# Patient Record
Sex: FEMALE | Race: WHITE | NOT HISPANIC OR LATINO | Employment: UNEMPLOYED | URBAN - NONMETROPOLITAN AREA
[De-identification: names, ages, dates, MRNs, and addresses within clinical notes are randomized per-mention and may not be internally consistent; named-entity substitution may affect disease eponyms.]

---

## 2023-06-14 ENCOUNTER — TELEPHONE (OUTPATIENT)
Dept: PRIMARY CARE | Facility: CLINIC | Age: 68
End: 2023-06-14
Payer: MEDICARE

## 2023-06-15 DIAGNOSIS — E03.9 HYPOTHYROIDISM, UNSPECIFIED TYPE: ICD-10-CM

## 2023-06-15 DIAGNOSIS — E78.2 MIXED HYPERLIPIDEMIA: ICD-10-CM

## 2023-06-15 DIAGNOSIS — E55.9 VITAMIN D DEFICIENCY: ICD-10-CM

## 2023-06-15 DIAGNOSIS — Z00.00 WELLNESS EXAMINATION: Primary | ICD-10-CM

## 2023-06-20 ENCOUNTER — LAB (OUTPATIENT)
Dept: LAB | Facility: LAB | Age: 68
End: 2023-06-20
Payer: MEDICARE

## 2023-06-20 DIAGNOSIS — Z00.00 WELLNESS EXAMINATION: ICD-10-CM

## 2023-06-20 DIAGNOSIS — E03.9 HYPOTHYROIDISM, UNSPECIFIED TYPE: ICD-10-CM

## 2023-06-20 DIAGNOSIS — E78.2 MIXED HYPERLIPIDEMIA: ICD-10-CM

## 2023-06-20 DIAGNOSIS — E55.9 VITAMIN D DEFICIENCY: ICD-10-CM

## 2023-06-20 LAB
ALANINE AMINOTRANSFERASE (SGPT) (U/L) IN SER/PLAS: 17 U/L (ref 7–45)
ALBUMIN (G/DL) IN SER/PLAS: 4.3 G/DL (ref 3.4–5)
ALKALINE PHOSPHATASE (U/L) IN SER/PLAS: 76 U/L (ref 33–136)
ANION GAP IN SER/PLAS: 12 MMOL/L (ref 10–20)
ASPARTATE AMINOTRANSFERASE (SGOT) (U/L) IN SER/PLAS: 22 U/L (ref 9–39)
BASOPHILS (10*3/UL) IN BLOOD BY AUTOMATED COUNT: 0.05 X10E9/L (ref 0–0.1)
BASOPHILS/100 LEUKOCYTES IN BLOOD BY AUTOMATED COUNT: 0.7 % (ref 0–2)
BILIRUBIN TOTAL (MG/DL) IN SER/PLAS: 1.7 MG/DL (ref 0–1.2)
CALCIDIOL (25 OH VITAMIN D3) (NG/ML) IN SER/PLAS: 104 NG/ML
CALCIUM (MG/DL) IN SER/PLAS: 9.6 MG/DL (ref 8.6–10.6)
CARBON DIOXIDE, TOTAL (MMOL/L) IN SER/PLAS: 25 MMOL/L (ref 21–32)
CHLORIDE (MMOL/L) IN SER/PLAS: 109 MMOL/L (ref 98–107)
CHOLESTEROL (MG/DL) IN SER/PLAS: 207 MG/DL (ref 0–199)
CHOLESTEROL IN HDL (MG/DL) IN SER/PLAS: 59.8 MG/DL
CHOLESTEROL/HDL RATIO: 3.5
CREATININE (MG/DL) IN SER/PLAS: 0.81 MG/DL (ref 0.5–1.05)
EOSINOPHILS (10*3/UL) IN BLOOD BY AUTOMATED COUNT: 0.15 X10E9/L (ref 0–0.7)
EOSINOPHILS/100 LEUKOCYTES IN BLOOD BY AUTOMATED COUNT: 2.2 % (ref 0–6)
ERYTHROCYTE DISTRIBUTION WIDTH (RATIO) BY AUTOMATED COUNT: 11.9 % (ref 11.5–14.5)
ERYTHROCYTE MEAN CORPUSCULAR HEMOGLOBIN CONCENTRATION (G/DL) BY AUTOMATED: 32.4 G/DL (ref 32–36)
ERYTHROCYTE MEAN CORPUSCULAR VOLUME (FL) BY AUTOMATED COUNT: 96 FL (ref 80–100)
ERYTHROCYTES (10*6/UL) IN BLOOD BY AUTOMATED COUNT: 4.25 X10E12/L (ref 4–5.2)
ESTIMATED AVERAGE GLUCOSE FOR HBA1C: 103 MG/DL
GFR FEMALE: 80 ML/MIN/1.73M2
GLUCOSE (MG/DL) IN SER/PLAS: 82 MG/DL (ref 74–99)
HEMATOCRIT (%) IN BLOOD BY AUTOMATED COUNT: 41 % (ref 36–46)
HEMOGLOBIN (G/DL) IN BLOOD: 13.3 G/DL (ref 12–16)
HEMOGLOBIN A1C/HEMOGLOBIN TOTAL IN BLOOD: 5.2 %
IMMATURE GRANULOCYTES/100 LEUKOCYTES IN BLOOD BY AUTOMATED COUNT: 0.3 % (ref 0–0.9)
LDL: 129 MG/DL (ref 0–99)
LEUKOCYTES (10*3/UL) IN BLOOD BY AUTOMATED COUNT: 6.9 X10E9/L (ref 4.4–11.3)
LYMPHOCYTES (10*3/UL) IN BLOOD BY AUTOMATED COUNT: 2.16 X10E9/L (ref 1.2–4.8)
LYMPHOCYTES/100 LEUKOCYTES IN BLOOD BY AUTOMATED COUNT: 31.2 % (ref 13–44)
MONOCYTES (10*3/UL) IN BLOOD BY AUTOMATED COUNT: 0.61 X10E9/L (ref 0.1–1)
MONOCYTES/100 LEUKOCYTES IN BLOOD BY AUTOMATED COUNT: 8.8 % (ref 2–10)
NEUTROPHILS (10*3/UL) IN BLOOD BY AUTOMATED COUNT: 3.94 X10E9/L (ref 1.2–7.7)
NEUTROPHILS/100 LEUKOCYTES IN BLOOD BY AUTOMATED COUNT: 56.8 % (ref 40–80)
NRBC (PER 100 WBCS) BY AUTOMATED COUNT: 0 /100 WBC (ref 0–0)
PLATELETS (10*3/UL) IN BLOOD AUTOMATED COUNT: 227 X10E9/L (ref 150–450)
POTASSIUM (MMOL/L) IN SER/PLAS: 4.8 MMOL/L (ref 3.5–5.3)
PROTEIN TOTAL: 6.3 G/DL (ref 6.4–8.2)
SODIUM (MMOL/L) IN SER/PLAS: 141 MMOL/L (ref 136–145)
THYROTROPIN (MIU/L) IN SER/PLAS BY DETECTION LIMIT <= 0.05 MIU/L: 0.14 MIU/L (ref 0.44–3.98)
THYROXINE (T4) FREE (NG/DL) IN SER/PLAS: 1.68 NG/DL (ref 0.78–1.48)
TRIGLYCERIDE (MG/DL) IN SER/PLAS: 90 MG/DL (ref 0–149)
UREA NITROGEN (MG/DL) IN SER/PLAS: 25 MG/DL (ref 6–23)
VLDL: 18 MG/DL (ref 0–40)

## 2023-06-20 PROCEDURE — 80061 LIPID PANEL: CPT

## 2023-06-20 PROCEDURE — 84443 ASSAY THYROID STIM HORMONE: CPT

## 2023-06-20 PROCEDURE — 83036 HEMOGLOBIN GLYCOSYLATED A1C: CPT

## 2023-06-20 PROCEDURE — 36415 COLL VENOUS BLD VENIPUNCTURE: CPT

## 2023-06-20 PROCEDURE — 82306 VITAMIN D 25 HYDROXY: CPT

## 2023-06-20 PROCEDURE — 85025 COMPLETE CBC W/AUTO DIFF WBC: CPT

## 2023-06-20 PROCEDURE — 84439 ASSAY OF FREE THYROXINE: CPT

## 2023-06-20 PROCEDURE — 80053 COMPREHEN METABOLIC PANEL: CPT

## 2023-06-21 PROBLEM — G89.29 ABDOMINAL PAIN, CHRONIC, RIGHT UPPER QUADRANT: Status: ACTIVE | Noted: 2023-06-21

## 2023-06-21 PROBLEM — E55.9 VITAMIN D DEFICIENCY: Status: ACTIVE | Noted: 2023-06-21

## 2023-06-21 PROBLEM — I25.84 CALCIFICATION OF CORONARY ARTERY: Status: ACTIVE | Noted: 2022-03-10

## 2023-06-21 PROBLEM — M54.50 LOW BACK PAIN: Status: ACTIVE | Noted: 2022-03-10

## 2023-06-21 PROBLEM — R93.1 ELEVATED CORONARY ARTERY CALCIUM SCORE: Status: ACTIVE | Noted: 2023-06-21

## 2023-06-21 PROBLEM — R91.1 PULMONARY NODULE: Status: ACTIVE | Noted: 2023-06-21

## 2023-06-21 PROBLEM — I25.10 CALCIFICATION OF CORONARY ARTERY: Status: ACTIVE | Noted: 2022-03-10

## 2023-06-21 PROBLEM — R10.11 ABDOMINAL PAIN, CHRONIC, RIGHT UPPER QUADRANT: Status: ACTIVE | Noted: 2023-06-21

## 2023-06-21 PROBLEM — M54.10 RADICULOPATHY WITH LOWER EXTREMITY SYMPTOMS: Status: ACTIVE | Noted: 2023-06-21

## 2023-06-21 PROBLEM — E78.5 HYPERLIPIDEMIA: Status: ACTIVE | Noted: 2022-03-10

## 2023-06-21 PROBLEM — E03.9 HYPOTHYROIDISM: Status: ACTIVE | Noted: 2022-03-10

## 2023-06-21 PROBLEM — K21.9 GASTROESOPHAGEAL REFLUX DISEASE: Status: ACTIVE | Noted: 2022-03-10

## 2023-06-21 PROBLEM — R93.89 ABNORMAL CHEST XRAY: Status: ACTIVE | Noted: 2023-06-21

## 2023-06-21 RX ORDER — CHOLECALCIFEROL (VITAMIN D3) 125 MCG
1 CAPSULE ORAL DAILY
COMMUNITY
End: 2023-06-23 | Stop reason: ALTCHOICE

## 2023-06-21 RX ORDER — BIOTIN 10 MG
TABLET ORAL
COMMUNITY

## 2023-06-21 RX ORDER — ASCORBIC ACID 500 MG
TABLET ORAL
COMMUNITY

## 2023-06-21 RX ORDER — FOLIC ACID 0.8 MG
TABLET ORAL
COMMUNITY

## 2023-06-21 RX ORDER — LEVOTHYROXINE SODIUM 100 UG/1
100 TABLET ORAL DAILY
COMMUNITY
End: 2023-07-21 | Stop reason: SDUPTHER

## 2023-06-23 ENCOUNTER — OFFICE VISIT (OUTPATIENT)
Dept: PRIMARY CARE | Facility: CLINIC | Age: 68
End: 2023-06-23
Payer: MEDICARE

## 2023-06-23 VITALS
BODY MASS INDEX: 24.81 KG/M2 | HEART RATE: 54 BPM | SYSTOLIC BLOOD PRESSURE: 99 MMHG | HEIGHT: 61 IN | WEIGHT: 131.4 LBS | DIASTOLIC BLOOD PRESSURE: 59 MMHG | RESPIRATION RATE: 14 BRPM | TEMPERATURE: 98.2 F | OXYGEN SATURATION: 97 %

## 2023-06-23 DIAGNOSIS — Z00.00 ROUTINE GENERAL MEDICAL EXAMINATION AT HEALTH CARE FACILITY: Primary | ICD-10-CM

## 2023-06-23 DIAGNOSIS — Z23 IMMUNIZATION DUE: ICD-10-CM

## 2023-06-23 DIAGNOSIS — Z12.31 BREAST CANCER SCREENING BY MAMMOGRAM: ICD-10-CM

## 2023-06-23 DIAGNOSIS — Z00.00 WELLNESS EXAMINATION: ICD-10-CM

## 2023-06-23 DIAGNOSIS — E03.9 HYPOTHYROIDISM, UNSPECIFIED TYPE: ICD-10-CM

## 2023-06-23 PROBLEM — Q79.0: Status: ACTIVE | Noted: 2022-03-10

## 2023-06-23 PROCEDURE — 1036F TOBACCO NON-USER: CPT | Performed by: FAMILY MEDICINE

## 2023-06-23 PROCEDURE — G0439 PPPS, SUBSEQ VISIT: HCPCS | Performed by: FAMILY MEDICINE

## 2023-06-23 PROCEDURE — 1170F FXNL STATUS ASSESSED: CPT | Performed by: FAMILY MEDICINE

## 2023-06-23 PROCEDURE — 1160F RVW MEDS BY RX/DR IN RCRD: CPT | Performed by: FAMILY MEDICINE

## 2023-06-23 PROCEDURE — 1159F MED LIST DOCD IN RCRD: CPT | Performed by: FAMILY MEDICINE

## 2023-06-23 RX ORDER — UBIDECARENONE 30 MG
30 CAPSULE ORAL DAILY
COMMUNITY

## 2023-06-23 ASSESSMENT — ACTIVITIES OF DAILY LIVING (ADL)
MANAGING_FINANCES: INDEPENDENT
DOING_HOUSEWORK: INDEPENDENT
GROCERY_SHOPPING: INDEPENDENT
TAKING_MEDICATION: INDEPENDENT
DRESSING: INDEPENDENT
BATHING: INDEPENDENT

## 2023-06-23 ASSESSMENT — PATIENT HEALTH QUESTIONNAIRE - PHQ9
1. LITTLE INTEREST OR PLEASURE IN DOING THINGS: NOT AT ALL
2. FEELING DOWN, DEPRESSED OR HOPELESS: NOT AT ALL
SUM OF ALL RESPONSES TO PHQ9 QUESTIONS 1 AND 2: 0

## 2023-06-23 ASSESSMENT — PAIN SCALES - GENERAL: PAINLEVEL: 0-NO PAIN

## 2023-06-23 NOTE — PROGRESS NOTES
"Subjective   Reason for Visit: Gely Zamorano is an 67 y.o. female here for a Medicare Wellness visit.     Past Medical, Surgical, and Family History reviewed and updated in chart.         HPI    Patient Care Team:  Param Estes MD as PCP - General  Param Estes MD as PCP - [facility] [place]     Review of Systems   All other systems reviewed and are negative.      Objective   Vitals:  BP 99/59 (BP Location: Left arm, Patient Position: Sitting, BP Cuff Size: Adult)   Pulse 54   Temp 36.8 °C (98.2 °F) (Temporal)   Resp 14   Ht 1.549 m (5' 1\")   Wt 59.6 kg (131 lb 6.4 oz)   LMP  (LMP Unknown)   SpO2 97%   BMI 24.83 kg/m²       Physical Exam  Vitals reviewed.   Constitutional:       Appearance: Normal appearance. She is normal weight.   HENT:      Head: Normocephalic and atraumatic.      Right Ear: Tympanic membrane, ear canal and external ear normal.      Left Ear: Tympanic membrane, ear canal and external ear normal.      Nose: Nose normal.      Mouth/Throat:      Mouth: Mucous membranes are moist.      Pharynx: Oropharynx is clear.   Eyes:      Extraocular Movements: Extraocular movements intact.      Conjunctiva/sclera: Conjunctivae normal.      Pupils: Pupils are equal, round, and reactive to light.   Neck:      Vascular: No carotid bruit.   Cardiovascular:      Rate and Rhythm: Normal rate and regular rhythm.      Pulses: Normal pulses.      Heart sounds: Normal heart sounds. No murmur heard.  Pulmonary:      Effort: Pulmonary effort is normal. No respiratory distress.      Breath sounds: Normal breath sounds. No wheezing, rhonchi or rales.   Abdominal:      General: Abdomen is flat. Bowel sounds are normal.      Palpations: Abdomen is soft. There is no mass.      Tenderness: There is no abdominal tenderness. There is no guarding.   Musculoskeletal:         General: No swelling or deformity. Normal range of motion.      Cervical back: Normal range of motion and neck supple.      Right " lower leg: No edema.      Left lower leg: No edema.   Lymphadenopathy:      Cervical: No cervical adenopathy.   Skin:     General: Skin is warm and dry.      Capillary Refill: Capillary refill takes less than 2 seconds.   Neurological:      General: No focal deficit present.      Mental Status: She is alert and oriented to person, place, and time.   Psychiatric:         Mood and Affect: Mood normal.         Behavior: Behavior normal.         Thought Content: Thought content normal.         Judgment: Judgment normal.         Assessment/Plan   Problem List Items Addressed This Visit       Hypothyroidism    Relevant Orders    TSH with reflex to Free T4 if abnormal    Comprehensive Metabolic Panel     Other Visit Diagnoses       Routine general medical examination at health care facility    -  Primary    Breast cancer screening by mammogram        Relevant Orders    BI mammo bilateral screening tomosynthesis    Immunization due        Wellness examination        Relevant Orders    Follow Up In [facility] - PCP          #1 Medicare wellness visit    You are up-to-date with her colonoscopy for colon cancer screening.  Please have a mammogram done this year for mammogram for breast cancer screening    For immunizations I will print the order for the shingles vaccine that can be done at your pharmacy as well as a Prevnar 20 pneumonia vaccine that can be done at your pharmacy    We did review all recent labs.  You have been taking biotin which will interfere with thyroid function testing.  Please repeat your thyroid test after 2 weeks of no biotin.  With those thyroid test I will also include the proteins and BUN and chloride test with a comprehensive metabolic and we will call you with those results    Continue eating a heart healthy diet rich with fruits vegetables and lean proteins avoiding simple sugars and fast foods    Continue with regular activity and exercise walking on a daily basis is ideal if you are  unable to run.    Continue following up with your orthopedic specialist in regards to your knee pain I do think PRP is a reasonable consideration for treatment    Your previous bone density indicated osteopenia.  We will repeat that in 1 year in the meantime continue eating a diet rich with calcium and vitamin D and regular exercise.    Your recent vitamin D levels were above normal.  Please discontinue all vitamin D for at least 1 month I think it would be safe to restart vitamin D at that time but the lower dose of just 1000 international units a day    Please continue on current doses of thyroid medication until we repeat your thyroid blood test    Please see your dermatologist as you mentioned    If you otherwise stay healthy I can see you back in 1 year but am happy to see you sooner if needed    You did mention you are having an episode of right upper quadrant abdominal discomfort.  If it returns I would recontact your gastroenterologist to discuss an EGD

## 2023-07-18 ENCOUNTER — LAB (OUTPATIENT)
Dept: LAB | Facility: LAB | Age: 68
End: 2023-07-18
Payer: MEDICARE

## 2023-07-18 DIAGNOSIS — E03.9 HYPOTHYROIDISM, UNSPECIFIED TYPE: ICD-10-CM

## 2023-07-18 LAB
ALANINE AMINOTRANSFERASE (SGPT) (U/L) IN SER/PLAS: 15 U/L (ref 7–45)
ALBUMIN (G/DL) IN SER/PLAS: 4.4 G/DL (ref 3.4–5)
ALKALINE PHOSPHATASE (U/L) IN SER/PLAS: 76 U/L (ref 33–136)
ANION GAP IN SER/PLAS: 14 MMOL/L (ref 10–20)
ASPARTATE AMINOTRANSFERASE (SGOT) (U/L) IN SER/PLAS: 20 U/L (ref 9–39)
BILIRUBIN TOTAL (MG/DL) IN SER/PLAS: 1.6 MG/DL (ref 0–1.2)
CALCIUM (MG/DL) IN SER/PLAS: 9.8 MG/DL (ref 8.6–10.6)
CARBON DIOXIDE, TOTAL (MMOL/L) IN SER/PLAS: 26 MMOL/L (ref 21–32)
CHLORIDE (MMOL/L) IN SER/PLAS: 108 MMOL/L (ref 98–107)
CREATININE (MG/DL) IN SER/PLAS: 0.71 MG/DL (ref 0.5–1.05)
GFR FEMALE: >90 ML/MIN/1.73M2
GLUCOSE (MG/DL) IN SER/PLAS: 78 MG/DL (ref 74–99)
POTASSIUM (MMOL/L) IN SER/PLAS: 4 MMOL/L (ref 3.5–5.3)
PROTEIN TOTAL: 6.4 G/DL (ref 6.4–8.2)
SODIUM (MMOL/L) IN SER/PLAS: 144 MMOL/L (ref 136–145)
THYROTROPIN (MIU/L) IN SER/PLAS BY DETECTION LIMIT <= 0.05 MIU/L: 0.05 MIU/L (ref 0.44–3.98)
THYROXINE (T4) FREE (NG/DL) IN SER/PLAS: 1.79 NG/DL (ref 0.78–1.48)
UREA NITROGEN (MG/DL) IN SER/PLAS: 26 MG/DL (ref 6–23)

## 2023-07-18 PROCEDURE — 36415 COLL VENOUS BLD VENIPUNCTURE: CPT

## 2023-07-18 PROCEDURE — 84443 ASSAY THYROID STIM HORMONE: CPT

## 2023-07-18 PROCEDURE — 84439 ASSAY OF FREE THYROXINE: CPT

## 2023-07-18 PROCEDURE — 80053 COMPREHEN METABOLIC PANEL: CPT

## 2023-07-21 DIAGNOSIS — E03.9 HYPOTHYROIDISM, UNSPECIFIED TYPE: Primary | ICD-10-CM

## 2023-07-21 RX ORDER — LEVOTHYROXINE SODIUM 88 UG/1
88 TABLET ORAL DAILY
Qty: 30 TABLET | Refills: 2 | Status: SHIPPED | OUTPATIENT
Start: 2023-07-21 | End: 2023-08-23 | Stop reason: SINTOL

## 2023-07-21 NOTE — RESULT ENCOUNTER NOTE
Call the patient and tell her the recent lab work indicates she is taking too much thyroid medication.  I recommend that she reduce dosing down to 88 mcg a day and repeat lab work in 6 weeks I will place an order for the new medication as well as for the repeat blood work

## 2023-08-22 ENCOUNTER — LAB (OUTPATIENT)
Dept: LAB | Facility: LAB | Age: 68
End: 2023-08-22
Payer: MEDICARE

## 2023-08-22 DIAGNOSIS — E03.9 HYPOTHYROIDISM, UNSPECIFIED TYPE: ICD-10-CM

## 2023-08-22 LAB
THYROTROPIN (MIU/L) IN SER/PLAS BY DETECTION LIMIT <= 0.05 MIU/L: 0.25 MIU/L (ref 0.44–3.98)
THYROXINE (T4) FREE (NG/DL) IN SER/PLAS: 1.58 NG/DL (ref 0.78–1.48)

## 2023-08-22 PROCEDURE — 84443 ASSAY THYROID STIM HORMONE: CPT

## 2023-08-22 PROCEDURE — 36415 COLL VENOUS BLD VENIPUNCTURE: CPT

## 2023-08-22 PROCEDURE — 84439 ASSAY OF FREE THYROXINE: CPT

## 2023-08-23 DIAGNOSIS — E03.9 ACQUIRED HYPOTHYROIDISM: Primary | ICD-10-CM

## 2023-08-23 RX ORDER — LEVOTHYROXINE SODIUM 75 UG/1
75 TABLET ORAL DAILY
Qty: 30 TABLET | Refills: 11 | Status: SHIPPED | OUTPATIENT
Start: 2023-08-23 | End: 2023-10-12

## 2023-10-10 ENCOUNTER — LAB (OUTPATIENT)
Dept: LAB | Facility: LAB | Age: 68
End: 2023-10-10
Payer: MEDICARE

## 2023-10-10 DIAGNOSIS — E03.9 ACQUIRED HYPOTHYROIDISM: ICD-10-CM

## 2023-10-10 PROCEDURE — 84443 ASSAY THYROID STIM HORMONE: CPT

## 2023-10-10 PROCEDURE — 84439 ASSAY OF FREE THYROXINE: CPT

## 2023-10-10 PROCEDURE — 36415 COLL VENOUS BLD VENIPUNCTURE: CPT

## 2023-10-11 LAB
T4 FREE SERPL-MCNC: 1.28 NG/DL (ref 0.78–1.48)
TSH SERPL-ACNC: 4.49 MIU/L (ref 0.44–3.98)

## 2023-10-12 ENCOUNTER — TELEPHONE (OUTPATIENT)
Dept: PRIMARY CARE | Facility: CLINIC | Age: 68
End: 2023-10-12
Payer: MEDICARE

## 2023-10-12 DIAGNOSIS — E03.9 ACQUIRED HYPOTHYROIDISM: Primary | ICD-10-CM

## 2023-10-12 DIAGNOSIS — E03.9 HYPOTHYROIDISM, UNSPECIFIED TYPE: ICD-10-CM

## 2023-10-12 RX ORDER — LEVOTHYROXINE SODIUM 88 UG/1
88 TABLET ORAL DAILY
Qty: 30 TABLET | Refills: 11 | Status: SHIPPED | OUTPATIENT
Start: 2023-10-12 | End: 2024-10-11

## 2023-10-12 RX ORDER — LEVOTHYROXINE SODIUM 88 UG/1
88 TABLET ORAL DAILY
Qty: 30 TABLET | Refills: 11 | Status: SHIPPED | OUTPATIENT
Start: 2023-10-12 | End: 2023-10-12 | Stop reason: ENTERED-IN-ERROR

## 2023-10-12 NOTE — TELEPHONE ENCOUNTER
Patient picked up new thyroid prescription    They filled it for levothyroxine NOT synthroid    She wanted to make sure you were aware and if you wanted her to take brand name only as discussed, please send that in again for 88mcg to TOMMIE Caruso with [facility]

## 2023-11-20 ENCOUNTER — ANCILLARY PROCEDURE (OUTPATIENT)
Dept: RADIOLOGY | Facility: CLINIC | Age: 68
End: 2023-11-20
Payer: MEDICARE

## 2023-11-20 DIAGNOSIS — R05.1 ACUTE COUGH: ICD-10-CM

## 2023-11-20 PROCEDURE — 71046 X-RAY EXAM CHEST 2 VIEWS: CPT | Mod: FR

## 2023-11-20 PROCEDURE — 71046 X-RAY EXAM CHEST 2 VIEWS: CPT | Mod: FOREIGN READ | Performed by: RADIOLOGY

## 2023-11-21 ENCOUNTER — LAB (OUTPATIENT)
Dept: LAB | Facility: LAB | Age: 68
End: 2023-11-21
Payer: MEDICARE

## 2023-11-21 DIAGNOSIS — E03.9 HYPOTHYROIDISM, UNSPECIFIED TYPE: ICD-10-CM

## 2023-11-21 LAB
T4 FREE SERPL-MCNC: 1.65 NG/DL (ref 0.78–1.48)
TSH SERPL-ACNC: 0.3 MIU/L (ref 0.44–3.98)

## 2023-11-21 PROCEDURE — 36415 COLL VENOUS BLD VENIPUNCTURE: CPT

## 2023-11-21 PROCEDURE — 84443 ASSAY THYROID STIM HORMONE: CPT

## 2023-11-21 PROCEDURE — 84439 ASSAY OF FREE THYROXINE: CPT

## 2023-11-27 DIAGNOSIS — E03.9 ACQUIRED HYPOTHYROIDISM: Primary | ICD-10-CM

## 2024-01-08 ENCOUNTER — ANCILLARY PROCEDURE (OUTPATIENT)
Dept: RADIOLOGY | Facility: CLINIC | Age: 69
End: 2024-01-08
Payer: MEDICARE

## 2024-01-08 DIAGNOSIS — S82.141D DISPLACED BICONDYLAR FRACTURE OF RIGHT TIBIA, SUBSEQUENT ENCOUNTER FOR CLOSED FRACTURE WITH ROUTINE HEALING: ICD-10-CM

## 2024-01-08 PROCEDURE — 73560 X-RAY EXAM OF KNEE 1 OR 2: CPT | Mod: RIGHT SIDE | Performed by: RADIOLOGY

## 2024-01-08 PROCEDURE — 73560 X-RAY EXAM OF KNEE 1 OR 2: CPT | Mod: RT

## 2024-01-22 ENCOUNTER — EVALUATION (OUTPATIENT)
Dept: PHYSICAL THERAPY | Facility: CLINIC | Age: 69
End: 2024-01-22
Payer: MEDICARE

## 2024-01-22 ENCOUNTER — HOSPITAL ENCOUNTER (OUTPATIENT)
Dept: RADIOLOGY | Facility: CLINIC | Age: 69
Discharge: HOME | End: 2024-01-22
Payer: MEDICARE

## 2024-01-22 DIAGNOSIS — S82.141D DISPLACED BICONDYLAR FRACTURE OF RIGHT TIBIA, SUBSEQUENT ENCOUNTER FOR CLOSED FRACTURE WITH ROUTINE HEALING: ICD-10-CM

## 2024-01-22 DIAGNOSIS — S82.131D RIGHT MEDIAL TIBIAL PLATEAU FRACTURE, CLOSED, WITH ROUTINE HEALING, SUBSEQUENT ENCOUNTER: ICD-10-CM

## 2024-01-22 DIAGNOSIS — R26.2 DIFFICULTY WALKING: ICD-10-CM

## 2024-01-22 DIAGNOSIS — M25.661 KNEE STIFF, RIGHT: Primary | ICD-10-CM

## 2024-01-22 PROBLEM — S82.131A RIGHT MEDIAL TIBIAL PLATEAU FRACTURE: Status: ACTIVE | Noted: 2024-01-22

## 2024-01-22 PROCEDURE — 73560 X-RAY EXAM OF KNEE 1 OR 2: CPT | Mod: RIGHT SIDE | Performed by: RADIOLOGY

## 2024-01-22 PROCEDURE — 73560 X-RAY EXAM OF KNEE 1 OR 2: CPT | Mod: RT

## 2024-01-22 PROCEDURE — 97110 THERAPEUTIC EXERCISES: CPT | Mod: GP | Performed by: PHYSICAL THERAPIST

## 2024-01-22 PROCEDURE — 97161 PT EVAL LOW COMPLEX 20 MIN: CPT | Mod: GP | Performed by: PHYSICAL THERAPIST

## 2024-01-22 ASSESSMENT — PATIENT HEALTH QUESTIONNAIRE - PHQ9
SUM OF ALL RESPONSES TO PHQ9 QUESTIONS 1 AND 2: 0
2. FEELING DOWN, DEPRESSED OR HOPELESS: NOT AT ALL
1. LITTLE INTEREST OR PLEASURE IN DOING THINGS: NOT AT ALL

## 2024-01-22 ASSESSMENT — ENCOUNTER SYMPTOMS
OCCASIONAL FEELINGS OF UNSTEADINESS: 0
LOSS OF SENSATION IN FEET: 0
DEPRESSION: 0

## 2024-01-22 ASSESSMENT — PAIN SCALES - GENERAL: PAINLEVEL_OUTOF10: 0 - NO PAIN

## 2024-01-22 ASSESSMENT — PAIN - FUNCTIONAL ASSESSMENT: PAIN_FUNCTIONAL_ASSESSMENT: 0-10

## 2024-01-22 NOTE — PROGRESS NOTES
Physical Therapy    Physical Therapy Lower Extremity Evaluation    Patient Name: Gely Zamorano  MRN: [id number]  Today's Date: 1/22/2024  Time Calculation  Start Time: 1135  Stop Time: 1220  Time Calculation (min): 45 min    Current Problem  Problem List Items Addressed This Visit             ICD-10-CM    Difficulty walking R26.2    Relevant Orders    Follow Up In Physical Therapy    Knee stiff, right - Primary M25.661    Relevant Orders    Follow Up In Physical Therapy    Right medial tibial plateau fracture S82.131A          Precautions  Precautions  Precautions Comment: 50% Wb x 1 week, then WBAT     Pain  Pain Assessment: 0-10  Pain Score: 0 - No pain    SUBJECTIVE:   Chief complaint:  Pt reports she had an injury to her R knee on 12-13-23 when her dog ran into her. Notes her dog hit her right knee form the side. Notes she feel to her left. Notes went to ER. Then saw ortho on 12/18/23 and had MRI that showed the fracture. Placed in brace locked in ext and NWB until today. Notes swelling in the knee and tightness. Notes weakness as well. Still using crutches for gait and is 50% WB on R LE ax 1 week then progress to full WB. Denies pain.    Pain Better: no pain     Pain Worse: no pain     Imaging: x-ray 12/13/23 was negative and then had MRI 12-18-23 that showed tibial plateau fracture.     Prior level of function: previously independent with all prior activity R LE including gait, stairs and ADL's.   Current limitations: walking, stairs    Home setup: 1 step to enter home, [employer] family room, stairs to basement.     Work: retired.     Patients goal: get leg stronger and walk normal      Prior tx: no prior TX R knee since injury or this year.     Medical hx has been reviewed with the patient.     OBJECTIVE:    Lower Extremity Strength:  MMT 5/5 max  RIGHT LEFT   Hip Flexion 4 5   Hip Extension 4 5   Hip Abduction 4+ 5   Hip Adduction 4+ 5   Knee Extension 4 5   Knee Flexion 4 5   Ankle DF 4+ 5   Ankle PF 4+ 5      Lower Extremity ROM: WNL unless documented below:  ROM in Degrees  RIGHT LEFT   Hip Flexion WNL WNL   Hip Extension WNL WNL   Hip Abduction WNL WNL   Hip Adduction WNL WNL   Knee Extension -5 WNL   Knee Flexion 113 WNL   Ankle DF 4 WNL   Ankle PF WNL  WNL     Joint mobility: Patella mobility hypomobile R all ranges  Gait mechanics: 50% WB R LE with use of crutches  Palpation: Tenderness to palpation lateral patella margins  Girth: R knee mid jt 37cm, L knee 34 cm   5 cm below mid jt R 35.5cm, L 32cm     Other Measures  Lower Extremity Funtional Score (LEFS): 10/80     TREATMENT:  Eval  2. Instruction in HEP:  Access Code: [email or web address]  URL: [email or web address]  Date: 01/22/2024  Prepared by: ADRIENNE Hugo    Exercises  - Quad Setting and Stretching (Mirrored)  - 1 x daily - 7 x weekly - 2 sets - 10 reps - 5 sec hold  - Seated Long Arc Quad  - 1 x daily - 7 x weekly - 2 sets - 10 reps  - Active Straight Leg Raise with Quad Set  - 1 x daily - 7 x weekly - 2 sets - 10 reps  - Sidelying Hip Abduction  - 1 x daily - 7 x weekly - 2 sets - 10 reps  - Prone Hip Extension  - 1 x daily - 7 x weekly - 2 sets - 10 reps  - Sidelying Hip Adduction  - 1 x daily - 7 x weekly - 2 sets - 10 reps  - Supine Heel Slide with Strap (Mirrored)  - 1 x daily - 7 x weekly - 2 sets - 10 reps  - Long Sitting Calf Stretch with Strap (Mirrored)  - 1 x daily - 7 x weekly - 1 sets - 3 reps - 30 sec  hold      ASSESSEMENT  Pt is a 68 y.o. referred to physical therapy for a dx of R tibial plateau fracture by Gretchen Castro DO. Pt presents with signs and symptoms of pain, weakness, reduced gait/balance and overall reduced function. This pt would benefit from a therapy program to restore prior level of function, reduce pain, increase AROM, increase strength, and improve gait and balance.     The physical therapy prognosis is good for the patient to achieve their goals.   The pt tolerated therapy treatment today well with no  adverse effects.  Barriers to therapy include:  none    PLAN  The pt will be seen 2 days a week for 6-8 weeks.      The pt has been educated about the risks and benefits of physical therapy including manual therapy treatments. Pt agrees with POC and gives consent for treatment.     The patient will benefit from physical therapy treatment to include: therapeutic exercises, therapeutic activities, neurological re-education, manual therapy, modalities, and a home exercise program.     Goals:  Active       PT Problem       Pt to ambulate with brace donned FWB R LE and least A.D.        Start:  01/22/24    Expected End:  02/11/24            Reduce edema R knee equal to left to allow for full function of the R knee.        Start:  01/22/24    Expected End:  02/21/24            Pt to be able to ambulate no A.D. FWB R LE with norm al gait pattern        Start:  01/22/24    Expected End:  02/21/24            Increase knee flexion to 140 degrees and ext to 0 degrees for gait, stairs, and ADL's.        Start:  01/22/24    Expected End:  02/21/24            Pt to increase LE strength to grossly 5/5 for improved gait, stairs, lifting and ADL's.        Start:  01/22/24    Expected End:  03/18/24            Pt to score an increase of 10 points or > on LEFS to display overall increased function.         Start:  01/22/24    Expected End:  03/18/24            Pt to be independent with a HEP for self management.        Start:  01/22/24    Expected End:  03/08/24

## 2024-01-24 ENCOUNTER — TREATMENT (OUTPATIENT)
Dept: PHYSICAL THERAPY | Facility: CLINIC | Age: 69
End: 2024-01-24
Payer: MEDICARE

## 2024-01-24 DIAGNOSIS — R26.2 DIFFICULTY WALKING: ICD-10-CM

## 2024-01-24 DIAGNOSIS — M25.661 KNEE STIFF, RIGHT: ICD-10-CM

## 2024-01-24 DIAGNOSIS — S82.131D RIGHT MEDIAL TIBIAL PLATEAU FRACTURE, CLOSED, WITH ROUTINE HEALING, SUBSEQUENT ENCOUNTER: Primary | ICD-10-CM

## 2024-01-24 PROCEDURE — 97110 THERAPEUTIC EXERCISES: CPT | Mod: GP,CQ

## 2024-01-24 ASSESSMENT — PAIN SCALES - GENERAL: PAINLEVEL_OUTOF10: 0 - NO PAIN

## 2024-01-24 ASSESSMENT — PAIN - FUNCTIONAL ASSESSMENT: PAIN_FUNCTIONAL_ASSESSMENT: 0-10

## 2024-01-24 NOTE — PROGRESS NOTES
Physical Therapy Treatment    Patient Name: Gely Zamorano  MRN: [id number]  Today's Date: 1/24/2024  Time Calculation  Start Time: 1445  Stop Time: 1540  Time Calculation (min): 55 min    Current Problem:  Problem List Items Addressed This Visit             ICD-10-CM    Difficulty walking R26.2    Knee stiff, right M25.661     Other Visit Diagnoses         Codes    Right medial tibial plateau fracture, closed, with routine healing, subsequent encounter    -  Primary S82.131D            Subjective   General:   Pt reports she has been doing well with 50% Wb'ing, brace opened and BL axillary crutches.   HEP is going well.   Edema in posterior knee.     Pain:  Pain Assessment: 0-10  Pain Score: 0 - No pain  Pain Location: Knee  Pain Orientation: Right    Precautions:  Precautions  Precautions Comment: 50% Wb x 1 week, then WBAT  1/29/24 WBAT       Objective   No objective measures taken this visit    Treatment:  Therapeutic exercise  Upright bike ROM x5 min  Seated R HSS/calf x 1 min  Seated R heel slide with slider 2x10   Seated HR/TR on 1/2 foam 2x10   R QS with SLR 2x10   R SAQ 3 sec hold 2x10   R LAQ 2 x10 HEP review  Prone TKE 2x10     Amb with BL axillary crutches around clinic 50% Wb'ing R LE      Neuromuscular Re-education       Manual       Modalities  Game ready L knee x 10 min  Skin intact    Assessment   Pt tolerated session well, demonstrating good ROM and tolerance to exercises.  Voiced some discomfort in medial knee with LAQ so cued pt to perform mid range.   Demonstrated good quad contraction with QS and prone TKE. Did well with tactile cues to quad to help facilitate quad contraction.   Discussed weaning from brace and 50% Wb'ing until 1/29.   Game ready post tx for edema management.   Plan    Continue to progress POC as tolerated by patient to improve strength, mobility and overall function    Goals:  Active       PT Problem       Pt to ambulate with brace donned FWB R LE and least A.D.         Start:  01/22/24    Expected End:  02/11/24            Reduce edema R knee equal to left to allow for full function of the R knee.        Start:  01/22/24    Expected End:  02/21/24            Pt to be able to ambulate no A.D. FWB R LE with norm al gait pattern        Start:  01/22/24    Expected End:  02/21/24            Increase knee flexion to 140 degrees and ext to 0 degrees for gait, stairs, and ADL's.        Start:  01/22/24    Expected End:  02/21/24            Pt to increase LE strength to grossly 5/5 for improved gait, stairs, lifting and ADL's.        Start:  01/22/24    Expected End:  03/18/24            Pt to score an increase of 10 points or > on LEFS to display overall increased function.         Start:  01/22/24    Expected End:  03/18/24            Pt to be independent with a HEP for self management.        Start:  01/22/24    Expected End:  03/08/24

## 2024-01-31 ENCOUNTER — TREATMENT (OUTPATIENT)
Dept: PHYSICAL THERAPY | Facility: CLINIC | Age: 69
End: 2024-01-31
Payer: MEDICARE

## 2024-01-31 DIAGNOSIS — R26.2 DIFFICULTY WALKING: ICD-10-CM

## 2024-01-31 DIAGNOSIS — M25.661 KNEE STIFF, RIGHT: ICD-10-CM

## 2024-01-31 DIAGNOSIS — S82.131D RIGHT MEDIAL TIBIAL PLATEAU FRACTURE, CLOSED, WITH ROUTINE HEALING, SUBSEQUENT ENCOUNTER: ICD-10-CM

## 2024-01-31 PROCEDURE — 97110 THERAPEUTIC EXERCISES: CPT | Mod: GP,CQ

## 2024-01-31 ASSESSMENT — PAIN SCALES - GENERAL: PAINLEVEL_OUTOF10: 0 - NO PAIN

## 2024-01-31 ASSESSMENT — PAIN - FUNCTIONAL ASSESSMENT: PAIN_FUNCTIONAL_ASSESSMENT: 0-10

## 2024-01-31 NOTE — PROGRESS NOTES
Physical Therapy Treatment    Patient Name: Gely Zamorano  MRN: [id number]  Today's Date: 1/31/2024  Time Calculation  Start Time: 1135  Stop Time: 1225  Time Calculation (min): 50 min    Current Problem:  Problem List Items Addressed This Visit             ICD-10-CM    Difficulty walking R26.2    Knee stiff, right M25.661     Other Visit Diagnoses         Codes    Right medial tibial plateau fracture, closed, with routine healing, subsequent encounter     S82.131D              Subjective   General:   Visit#: 3 of MN   Pt reports she has been gradually weaning to WBAT for approx 4 days. She is ambulating with 1 crutch, except when traversing stairs then she uses 2. Knee is swelling a bit more since increasing Wb'ing, as expected.  Anxious to progress, has been compliant with HEP + some of the exercises performed last session.     Pain:  Pain Assessment: 0-10  Pain Score: 0 - No pain  Pain Location: Knee  Pain Orientation: Right    Precautions:  Precautions  Precautions Comment:  (WBAT)  1/29/24 WBAT       Objective   No objective measures taken this visit    Treatment:  Therapeutic exercise  Upright bike x5 min  Standing R HSS x1 min  Standing calf stretch fitter board x1 min  Standing HR/TR 2x10 added to HEP    Standing hip abd, ext x10 BL added to HEP   R step up 2'' x10  1 to no UE support   R step down, reverse step up 2'' x10   Prone TKE 2x10 HEP   R TKE GTB 2x10 added to HEP   Amb with 1 crutch around clinic     Neuromuscular Re-education       Manual       Modalities  Game ready L knee x 10 min  Skin intact    Assessment   Pt progressing very well in general and with WBAT. Did well with gait tx with 1 crutches with cues for great toe push off. Weakness in R LE with standing exercises but overall tolerated very well with good pace and motivation. Updated HEP. Pt requested more exercises in HEP so follow up with compliance / questions and review if needed next session.   Responds well to game ready post tx  for edema management.   Plan    Continue to progress POC as tolerated by patient to improve strength, mobility and overall function    Access Code: [email or web address]  URL: [email or web address]  Date: 01/31/2024  Prepared by: Meredith Meredith    Exercises  - Supine Heel Slide with Strap (Mirrored)  - 1 x daily - 7 x weekly - 2 sets - 10 reps  - Gastroc Stretch on Wall  - 1 x daily - 7 x weekly - 1 sets - 1 min  hold  - Standing Hamstring Stretch with Step  - 1 x daily - 7 x weekly - 1 sets - 1 min hold  - Heel Raises with Counter Support  - 1 x daily - 7 x weekly - 1-2 sets - 10 reps  - Toe Raises with Counter Support  - 1 x daily - 7 x weekly - 1-2 sets - 10 reps  - Standing Hip Extension  - 1 x daily - 7 x weekly - 1-2 sets - 10 reps  - Standing Hip Abduction  - 1 x daily - 7 x weekly - 1-2 sets - 10 reps  - Standing Terminal Knee Extension with Resistance  - 1 x daily - 7 x weekly - 1-2 sets - 10 reps - 3-5 sec hold  - Seated Long Arc Quad  - 1 x daily - 7 x weekly - 2 sets - 10 reps  - Prone Quadriceps Set  - 1 x daily - 7 x weekly - 2 sets - 10 reps - 3-5 sec hold  - Sidelying Hip Abduction  - 1 x daily - 3-5 x weekly - 2 sets - 10 reps  - Prone Hip Extension  - 1 x daily - 3-5 x weekly - 2 sets - 10 reps  - Sidelying Hip Adduction  - 1 x daily - 3-5 x weekly - 2 sets - 10 reps    Goals:  Active       PT Problem       Pt to ambulate with brace donned FWB R LE and least A.D.        Start:  01/22/24    Expected End:  02/11/24            Reduce edema R knee equal to left to allow for full function of the R knee.        Start:  01/22/24    Expected End:  02/21/24            Pt to be able to ambulate no A.D. FWB R LE with norm al gait pattern        Start:  01/22/24    Expected End:  02/21/24            Increase knee flexion to 140 degrees and ext to 0 degrees for gait, stairs, and ADL's.        Start:  01/22/24    Expected End:  02/21/24            Pt to increase LE strength to grossly 5/5 for improved  gait, stairs, lifting and ADL's.        Start:  01/22/24    Expected End:  03/18/24            Pt to score an increase of 10 points or > on LEFS to display overall increased function.         Start:  01/22/24    Expected End:  03/18/24            Pt to be independent with a HEP for self management.        Start:  01/22/24    Expected End:  03/08/24

## 2024-02-05 ENCOUNTER — APPOINTMENT (OUTPATIENT)
Dept: PHYSICAL THERAPY | Facility: CLINIC | Age: 69
End: 2024-02-05
Payer: MEDICARE

## 2024-02-07 ENCOUNTER — TREATMENT (OUTPATIENT)
Dept: PHYSICAL THERAPY | Facility: CLINIC | Age: 69
End: 2024-02-07
Payer: MEDICARE

## 2024-02-07 DIAGNOSIS — R26.2 DIFFICULTY WALKING: ICD-10-CM

## 2024-02-07 DIAGNOSIS — M25.661 KNEE STIFF, RIGHT: Primary | ICD-10-CM

## 2024-02-07 DIAGNOSIS — S82.131D RIGHT MEDIAL TIBIAL PLATEAU FRACTURE, CLOSED, WITH ROUTINE HEALING, SUBSEQUENT ENCOUNTER: ICD-10-CM

## 2024-02-07 PROCEDURE — 97110 THERAPEUTIC EXERCISES: CPT | Mod: GP | Performed by: PHYSICAL THERAPIST

## 2024-02-07 ASSESSMENT — PAIN SCALES - GENERAL: PAINLEVEL_OUTOF10: 0 - NO PAIN

## 2024-02-07 ASSESSMENT — PAIN - FUNCTIONAL ASSESSMENT: PAIN_FUNCTIONAL_ASSESSMENT: 0-10

## 2024-02-07 NOTE — LETTER
February 7, 2024    Gretchen Castro DO  [address]  [address]  [place] [address]    Patient: Gely Zamorano   YOB: 1955   Date of Visit: 2/7/2024       Dear Gretchen Castro DO  [address]  [address]  [place] [address]    The attached plan of care is being sent to you because your patient’s medical reimbursement requires that you certify the plan of care. Your signature is required to allow uninterrupted insurance coverage.      You may indicate your approval by signing below and faxing this form back to us at Dept Fax: [phone].    Please call Dept: [phone] with any questions or concerns.    Thank you for this referral,        Bethel Goldman, PT  [facility] [address]  [facility]  [address]  [place] [address]    Payer: Payor: MEDICARE / Plan: MEDICARE PART A AND B / Product Type: *No Product type* /                                                                         Date:     Dear Bethel Goldman, PT,     Re: Ms. Gely Zamorano, MRN:[id number]    I certify that I have reviewed the attached plan of care and it is medically necessary for Ms. Gely Zamorano (1955) who is under my care.          ______________________________________                    _________________  Provider name and credentials                                           Date and time                                                                                           Plan of Care 1/22/24   Effective from: 1/22/2024  Effective to: 4/21/2024    Plan ID: [id number]                Participants as of Finalize on 1/22/2024      Name Type Comments Contact Info    Gretchen Castro DO Referring Provider  [phone]           Last Plan Note       Author: Bethel Goldman, PT Status: Incomplete Last edited: 1/22/2024 11:30 AM         Physical Therapy    Physical Therapy Lower Extremity Evaluation    Patient Name: Gely Zamorano  MRN: [id number]  Today's Date: 1/22/2024  Time  Calculation  Start Time: 1135  Stop Time: 1220  Time Calculation (min): 45 min    Current Problem  Problem List Items Addressed This Visit             ICD-10-CM    Difficulty walking R26.2    Relevant Orders    Follow Up In Physical Therapy    Knee stiff, right - Primary M25.661    Relevant Orders    Follow Up In Physical Therapy    Right medial tibial plateau fracture S82.131A          Precautions  Precautions  Precautions Comment: 50% Wb x 1 week, then WBAT     Pain  Pain Assessment: 0-10  Pain Score: 0 - No pain    SUBJECTIVE:   Chief complaint:  Pt reports she had an injury to her R knee on 12-13-23 when her dog ran into her. Notes her dog hit her right knee form the side. Notes she feel to her left. Notes went to ER. Then saw ortho on 12/18/23 and had MRI that showed the fracture. Placed in brace locked in ext and NWB until today. Notes swelling in the knee and tightness. Notes weakness as well. Still using crutches for gait and is 50% WB on R LE ax 1 week then progress to full WB. Denies pain.    Pain Better: no pain     Pain Worse: no pain     Imaging: x-ray 12/13/23 was negative and then had MRI 12-18-23 that showed tibial plateau fracture.     Prior level of function: previously independent with all prior activity R LE including gait, stairs and ADL's.   Current limitations: walking, stairs    Home setup: 1 step to enter home, [employer] family room, stairs to basement.     Work: retired.     Patients goal: get leg stronger and walk normal      Prior tx: no prior TX R knee since injury or this year.     Medical hx has been reviewed with the patient.     OBJECTIVE:    Lower Extremity Strength:  MMT 5/5 max  RIGHT LEFT   Hip Flexion 4 5   Hip Extension 4 5   Hip Abduction 4+ 5   Hip Adduction 4+ 5   Knee Extension 4 5   Knee Flexion 4 5   Ankle DF 4+ 5   Ankle PF 4+ 5     Lower Extremity ROM: WNL unless documented below:  ROM in Degrees  RIGHT LEFT   Hip Flexion WNL WNL   Hip Extension WNL WNL   Hip Abduction WNL  WNL   Hip Adduction WNL WNL   Knee Extension -5 WNL   Knee Flexion 113 WNL   Ankle DF 4 WNL   Ankle PF WNL  WNL     Joint mobility: Patella mobility hypomobile R all ranges  Gait mechanics: 50% WB R LE with use of crutches  Palpation: Tenderness to palpation lateral patella margins  Girth: R knee mid jt 37cm, L knee 34 cm   5 cm below mid jt R 35.5cm, L 32cm     Other Measures  Lower Extremity Funtional Score (LEFS): 10/80     TREATMENT:  Eval  2. Instruction in HEP:  Access Code: [email or web address]  URL: [email or web address]  Date: 01/22/2024  Prepared by:  Bethel    Exercises  - Quad Setting and Stretching (Mirrored)  - 1 x daily - 7 x weekly - 2 sets - 10 reps - 5 sec hold  - Seated Long Arc Quad  - 1 x daily - 7 x weekly - 2 sets - 10 reps  - Active Straight Leg Raise with Quad Set  - 1 x daily - 7 x weekly - 2 sets - 10 reps  - Sidelying Hip Abduction  - 1 x daily - 7 x weekly - 2 sets - 10 reps  - Prone Hip Extension  - 1 x daily - 7 x weekly - 2 sets - 10 reps  - Sidelying Hip Adduction  - 1 x daily - 7 x weekly - 2 sets - 10 reps  - Supine Heel Slide with Strap (Mirrored)  - 1 x daily - 7 x weekly - 2 sets - 10 reps  - Long Sitting Calf Stretch with Strap (Mirrored)  - 1 x daily - 7 x weekly - 1 sets - 3 reps - 30 sec  hold      ASSESSEMENT  Pt is a 68 y.o. referred to physical therapy for a dx of R tibial plateau fracture by Gretchen Castro DO. Pt presents with signs and symptoms of pain, weakness, reduced gait/balance and overall reduced function. This pt would benefit from a therapy program to restore prior level of function, reduce pain, increase AROM, increase strength, and improve gait and balance.     The physical therapy prognosis is good for the patient to achieve their goals.   The pt tolerated therapy treatment today well with no adverse effects.  Barriers to therapy include:  none    PLAN  The pt will be seen 2 days a week for 6-8 weeks.      The pt has been educated about the  risks and benefits of physical therapy including manual therapy treatments. Pt agrees with POC and gives consent for treatment.     The patient will benefit from physical therapy treatment to include: therapeutic exercises, therapeutic activities, neurological re-education, manual therapy, modalities, and a home exercise program.     Goals:  Active       PT Problem       Pt to ambulate with brace donned FWB R LE and least A.D.        Start:  01/22/24    Expected End:  02/11/24            Reduce edema R knee equal to left to allow for full function of the R knee.        Start:  01/22/24    Expected End:  02/21/24            Pt to be able to ambulate no A.D. FWB R LE with norm al gait pattern        Start:  01/22/24    Expected End:  02/21/24            Increase knee flexion to 140 degrees and ext to 0 degrees for gait, stairs, and ADL's.        Start:  01/22/24    Expected End:  02/21/24            Pt to increase LE strength to grossly 5/5 for improved gait, stairs, lifting and ADL's.        Start:  01/22/24    Expected End:  03/18/24            Pt to score an increase of 10 points or > on LEFS to display overall increased function.         Start:  01/22/24    Expected End:  03/18/24            Pt to be independent with a HEP for self management.        Start:  01/22/24    Expected End:  03/08/24                      Current Participants as of 2/7/2024      Name Type Comments Contact Info    Gretchen Castro DO Referring Provider  [phone]    Signature pending

## 2024-02-07 NOTE — PROGRESS NOTES
"Physical Therapy    Physical Therapy Treatment    Patient Name: Gely Zamorano  MRN: [id number]  Today's Date: 2/7/2024  Time Calculation  Start Time: 1000  Stop Time: 1055  Time Calculation (min): 55 min  Payor: MEDICARE / Plan: MEDICARE PART A AND B / Product Type: *No Product type* /     General Comment: Visit 4 of [place]    Current Problem    Problem List Items Addressed This Visit             ICD-10-CM    Difficulty walking R26.2    Knee stiff, right - Primary M25.661     Other Visit Diagnoses         Codes    Right medial tibial plateau fracture, closed, with routine healing, subsequent encounter     S82.131D             Subjective   Pt reports she still has some swelling in and around her R ankle from overdoing this past weekend. Notes overall she feels she is doing better with movement in the knee and is not as tight as it was previously. Ambulated into the clinic today with WBAT and no crutches today.   Compliance with HEP-yes    Precautions  Precautions  Precautions Comment: WBAT R LE at this time    Pain  Pain Assessment: 0-10  Pain Score: 0 - No pain    Objective   Knee flexion 120 degrees AROM R knee    Treatments:  Therapeutic exercise x 40 min   Upright bike x5 min  Standing R HSS x1 min  Standing calf stretch fitter board x1 min  Standing 3 way HR 1/2 foam 10 x ea   Standing hip abd, ext 2 x 10 BL with YTB   R step up  4'' 2 x 10  1 to no UE support   Lateral step up 4\" 2 x 10  R step down, reverse step up 4'' 2 x 10     Neuromuscular Re-education x 5 min  Airex march x 2 min   Airex tandem stand R/L x 1 min ea  SLS R LE 20\" x 3     Modalities x 10 min   Game ready L knee x 10 min- not billed   Skin intact    Assessment:  Pt overall tolerated treatment well and is progressing with her WB on the R LE and with gait. Improving AROM as well in the knee but still some tightness into ext. Progressing towards her goals.     Plan:  Continue to progress WB activity and wean from brace.     Goals:  Active       " PT Problem       Pt to ambulate with brace donned FWB R LE and least A.D.        Start:  01/22/24    Expected End:  02/11/24            Reduce edema R knee equal to left to allow for full function of the R knee.        Start:  01/22/24    Expected End:  02/21/24            Pt to be able to ambulate no A.D. FWB R LE with norm al gait pattern        Start:  01/22/24    Expected End:  02/21/24            Increase knee flexion to 140 degrees and ext to 0 degrees for gait, stairs, and ADL's.        Start:  01/22/24    Expected End:  02/21/24            Pt to increase LE strength to grossly 5/5 for improved gait, stairs, lifting and ADL's.        Start:  01/22/24    Expected End:  03/18/24            Pt to score an increase of 10 points or > on LEFS to display overall increased function.         Start:  01/22/24    Expected End:  03/18/24            Pt to be independent with a HEP for self management.        Start:  01/22/24    Expected End:  03/08/24

## 2024-02-12 ENCOUNTER — TREATMENT (OUTPATIENT)
Dept: PHYSICAL THERAPY | Facility: CLINIC | Age: 69
End: 2024-02-12
Payer: MEDICARE

## 2024-02-12 DIAGNOSIS — S82.131D RIGHT MEDIAL TIBIAL PLATEAU FRACTURE, CLOSED, WITH ROUTINE HEALING, SUBSEQUENT ENCOUNTER: ICD-10-CM

## 2024-02-12 DIAGNOSIS — R26.2 DIFFICULTY WALKING: ICD-10-CM

## 2024-02-12 DIAGNOSIS — M25.661 KNEE STIFF, RIGHT: ICD-10-CM

## 2024-02-12 PROCEDURE — 97110 THERAPEUTIC EXERCISES: CPT | Mod: GP | Performed by: PHYSICAL THERAPIST

## 2024-02-12 ASSESSMENT — PAIN - FUNCTIONAL ASSESSMENT: PAIN_FUNCTIONAL_ASSESSMENT: 0-10

## 2024-02-12 ASSESSMENT — PAIN SCALES - GENERAL: PAINLEVEL_OUTOF10: 0 - NO PAIN

## 2024-02-12 NOTE — PROGRESS NOTES
"Physical Therapy    Physical Therapy Treatment    Patient Name: Gely Zamorano  MRN: [id number]  Today's Date: 2/12/2024  Time Calculation  Start Time: 1045  Stop Time: 1140  Time Calculation (min): 55 min  Payor: MEDICARE / Plan: MEDICARE PART A AND B / Product Type: *No Product type* /     General Comment: Visit 5 of [place]    Current Problem    Problem List Items Addressed This Visit             ICD-10-CM    Difficulty walking R26.2    Knee stiff, right M25.661     Other Visit Diagnoses         Codes    Right medial tibial plateau fracture, closed, with routine healing, subsequent encounter     S82.131D           Subjective   Pt overall still notes edema in the lower leg around her ankle. Notes no pain today.   Compliance with HEP-yes    Precautions  Precautions  Precautions Comment: WBAT R LE at this time    Pain  Pain Assessment: 0-10  Pain Score: 0 - No pain    Objective   128 degrees flex AROM R knee.     Treatments:  Therapeutic exercise x 40 min   Upright bike x5 min  Standing R HSS x1 min  Standing calf stretch fitter board x1 min  Standing 3 way HR 1/2 foam 10 x ea   Standing hip abd, ext 2 x 10 BL with YTB   R step up  6'' 2 x 10  1 to no UE support   Lateral step up 6\" 2 x 10  R step down, reverse step up 6'' 2 x 10   Mini lunges 2 x 10 R Le  Wall slides- next visit     Neuromuscular Re-education x 5 min  Airex march x 2 min   Airex tandem stand R/L x 1 min ea  SLS R LE 20\" x 3     Modalities x 10 min   Game ready L knee x 10 min- not billed   Skin intact    Assessment:  Pt overall progressing well with her knee ROM and is improving with her gait with no brace donned. Pt overall progressing towards her LTG's at this time. Recommended to continue to progress her gait with more walking at home.     Plan:  Continue to progress as tolerated with WB activity on the R LE with wall slides next visit. Re-check next week    Goals:  Active       PT Problem       Pt to ambulate with brace donned FWB R LE and least " A.D.  (Met)       Start:  01/22/24    Expected End:  02/11/24    Resolved:  02/12/24         Reduce edema R knee equal to left to allow for full function of the R knee.        Start:  01/22/24    Expected End:  02/21/24            Pt to be able to ambulate no A.D. FWB R LE with norm al gait pattern  (Progressing)       Start:  01/22/24    Expected End:  02/21/24            Increase knee flexion to 140 degrees and ext to 0 degrees for gait, stairs, and ADL's.  (Progressing)       Start:  01/22/24    Expected End:  02/21/24            Pt to increase LE strength to grossly 5/5 for improved gait, stairs, lifting and ADL's.  (Progressing)       Start:  01/22/24    Expected End:  03/18/24            Pt to score an increase of 10 points or > on LEFS to display overall increased function.         Start:  01/22/24    Expected End:  03/18/24            Pt to be independent with a HEP for self management.        Start:  01/22/24    Expected End:  03/08/24

## 2024-02-14 ENCOUNTER — TREATMENT (OUTPATIENT)
Dept: PHYSICAL THERAPY | Facility: CLINIC | Age: 69
End: 2024-02-14
Payer: MEDICARE

## 2024-02-14 DIAGNOSIS — M25.661 KNEE STIFF, RIGHT: ICD-10-CM

## 2024-02-14 DIAGNOSIS — S82.131D RIGHT MEDIAL TIBIAL PLATEAU FRACTURE, CLOSED, WITH ROUTINE HEALING, SUBSEQUENT ENCOUNTER: ICD-10-CM

## 2024-02-14 DIAGNOSIS — R26.2 DIFFICULTY WALKING: ICD-10-CM

## 2024-02-14 PROCEDURE — 97110 THERAPEUTIC EXERCISES: CPT | Mod: GP,CQ

## 2024-02-14 PROCEDURE — 97112 NEUROMUSCULAR REEDUCATION: CPT | Mod: GP,CQ

## 2024-02-14 ASSESSMENT — PAIN - FUNCTIONAL ASSESSMENT: PAIN_FUNCTIONAL_ASSESSMENT: 0-10

## 2024-02-14 ASSESSMENT — PAIN SCALES - GENERAL: PAINLEVEL_OUTOF10: 1

## 2024-02-14 NOTE — PROGRESS NOTES
"Physical Therapy    Physical Therapy Treatment    Patient Name: Gely Zamorano  MRN: [id number]  Today's Date: 2/14/2024  Time Calculation  Start Time: 1045  Stop Time: 1140  Time Calculation (min): 55 min  Payor: MEDICARE / Plan: MEDICARE PART A AND B / Product Type: *No Product type* /     General Comment: Visit 6 of [place]    Current Problem    Problem List Items Addressed This Visit             ICD-10-CM    Difficulty walking R26.2    Knee stiff, right M25.661     Other Visit Diagnoses         Codes    Right medial tibial plateau fracture, closed, with routine healing, subsequent encounter     S82.131D             Subjective   Pt reports increased anterior knee pain since last session.  Edema in lower leg around her ankle persists.   Feels like she overdid it with step exercises last session.   Compliance with HEP-yes     Precautions  Precautions  Precautions Comment: WBAT R LE at this time    Pain  Pain Assessment: 0-10  Pain Score: 1  Pain Location: Knee  Pain Orientation: Right    Objective   No measures today    Treatments:  Therapeutic exercise x 35 min   Upright bike x5 min  Standing R HSS x1 min  Standing calf stretch fitter board x1 min  Standing 3 way HR 1/2 foam 2x10 x ea - doing at home   Standing hip abd, ext 2 x 10 BL with RTB   R step up  6'' 2 x 10  1 to no UE support -held d/t pain   Lateral step up 6\" 2 x 10- held d/t pain  R step down, reverse step up 6'' 2 x 10 - held d/t pain   Mini lunges 2 x 10 R Le -   Wall slides small range x10 (pain free)  R LAQ 2# 2x10     Neuromuscular Re-education x 10 min  Airex march x 2 min   Airex tandem stand with YKB CW/CCW x30 sec ea direction  SLS R LE 30\" x 3   Fitter board 2 directions x1 min ea   Wilbur (low) fwd/retro 2x10 , R lead  Wilbur (low) lat 2x10     Modalities x 10 min   Game ready L knee x 10 min- not billed   Skin intact    Assessment:  Pt continues to progress with tx. Held step exercises d/t increased knee pain today. Plan to resume next visit " if tolerated. Was able to perform wall slides and lunges but in small, pain free range.   Discussed compression stocking for edema.     Plan:  Continue to progress as tolerated with WB activity on the R LE with wall slides next visit. Re-check next week    Goals:  Active       PT Problem       Pt to ambulate with brace donned FWB R LE and least A.D.  (Met)       Start:  01/22/24    Expected End:  02/11/24    Resolved:  02/12/24         Reduce edema R knee equal to left to allow for full function of the R knee.        Start:  01/22/24    Expected End:  02/21/24            Pt to be able to ambulate no A.D. FWB R LE with norm al gait pattern  (Progressing)       Start:  01/22/24    Expected End:  02/21/24            Increase knee flexion to 140 degrees and ext to 0 degrees for gait, stairs, and ADL's.  (Progressing)       Start:  01/22/24    Expected End:  02/21/24            Pt to increase LE strength to grossly 5/5 for improved gait, stairs, lifting and ADL's.  (Progressing)       Start:  01/22/24    Expected End:  03/18/24            Pt to score an increase of 10 points or > on LEFS to display overall increased function.         Start:  01/22/24    Expected End:  03/18/24            Pt to be independent with a HEP for self management.        Start:  01/22/24    Expected End:  03/08/24

## 2024-02-19 ENCOUNTER — APPOINTMENT (OUTPATIENT)
Dept: PHYSICAL THERAPY | Facility: CLINIC | Age: 69
End: 2024-02-19
Payer: MEDICARE

## 2024-02-19 ENCOUNTER — DOCUMENTATION (OUTPATIENT)
Dept: PHYSICAL THERAPY | Facility: CLINIC | Age: 69
End: 2024-02-19
Payer: MEDICARE

## 2024-02-19 NOTE — PROGRESS NOTES
Physical Therapy                 Therapy Communication Note    Patient Name: Gely Zamorano  MRN: [id number]  Today's Date: 2/19/2024     Discipline: Physical Therapy    Missed Visit Reason:      Missed Time: Cancel    Comment: [facility]

## 2024-02-22 ENCOUNTER — TREATMENT (OUTPATIENT)
Dept: PHYSICAL THERAPY | Facility: CLINIC | Age: 69
End: 2024-02-22
Payer: MEDICARE

## 2024-02-22 DIAGNOSIS — R26.2 DIFFICULTY WALKING: ICD-10-CM

## 2024-02-22 DIAGNOSIS — M25.661 KNEE STIFF, RIGHT: Primary | ICD-10-CM

## 2024-02-22 DIAGNOSIS — S82.131D RIGHT MEDIAL TIBIAL PLATEAU FRACTURE, CLOSED, WITH ROUTINE HEALING, SUBSEQUENT ENCOUNTER: ICD-10-CM

## 2024-02-22 PROCEDURE — 97112 NEUROMUSCULAR REEDUCATION: CPT | Mod: GP | Performed by: PHYSICAL THERAPIST

## 2024-02-22 PROCEDURE — 97110 THERAPEUTIC EXERCISES: CPT | Mod: GP | Performed by: PHYSICAL THERAPIST

## 2024-02-22 ASSESSMENT — PAIN SCALES - GENERAL: PAINLEVEL_OUTOF10: 1

## 2024-02-22 ASSESSMENT — PAIN - FUNCTIONAL ASSESSMENT: PAIN_FUNCTIONAL_ASSESSMENT: 0-10

## 2024-02-22 NOTE — PROGRESS NOTES
"Physical Therapy    Physical Therapy Treatment    Patient Name: Gely Zamorano  MRN: [id number]  Today's Date: 2/22/2024  Time Calculation  Start Time: 1000  Stop Time: 1055  Time Calculation (min): 55 min  PT Therapeutic Procedures Time Entry  Neuromuscular Re-Education Time Entry: 10  Therapeutic Exercise Time Entry: 35  Non-Billable Time  Non-billable time: 10  Non-billable time reason: Gameready 10 min ice  Payor: MEDICARE / Plan: MEDICARE PART A AND B / Product Type: *No Product type* /     General Comment: Visit 7 of [place]    Current Problem    Problem List Items Addressed This Visit             ICD-10-CM    Difficulty walking R26.2    Knee stiff, right - Primary M25.661     Other Visit Diagnoses         Codes    Right medial tibial plateau fracture, closed, with routine healing, subsequent encounter     S82.131D           Subjective   Pt overall notes she has been doing well with her knee but still some issues on descending the stairs due to feeling weak in the knee yet. Notes going ascending stairs is no issue.   Compliance with HEP-yes    Precautions  Precautions  Precautions Comment: WBAT R LE at this time    Pain  Pain Assessment: 0-10  Pain Score: 1  Pain Location: Knee  Pain Orientation: Right    Objective   No measures today     Treatments:  Therapeutic exercise x 35 min   Upright bike x5 min  Standing R HSS x1 min  Standing calf stretch fitter board x1 min  Standing 3 way HR 1/2 foam 2x10 x ea - doing at home   Standing hip abd, ext 2 x 10 BL with RTB   R step up  6'' 2 x 10  1 to no UE support -held d/t pain   Lateral step up 6\" 2 x 10- held d/t pain  R step down, reverse step up 6'' 2 x 10 - held d/t pain   Mini lunges 2 x 10 R Le -   Wall slides small range x10 (pain free)  R LAQ 2# 2x10     Neuromuscular Re-education x 10 min  Airex march x 2 min   Airex tandem stand with YKB CW/CCW x30 sec ea direction  SLS R LE 30\" x 3   Fitter board 2 directions x1 min ea   Wilbur (low) fwd/retro 2x10 , R " lead  Wilbur (low) lat 2x10     Modalities x 10 min   Game ready L knee x 10 min- not billed   Skin intact    Assessment:  Pt overall tolerated tx well with no significant issues noted. Improved with stairs but still some discomfort lower calf. Still noted with edema in the lower leg as well.     Plan:  Pt to continue 3 more visits then plan to discharge if doing well to her HEP.     Goals:  Active       PT Problem       Pt to ambulate with brace donned FWB R LE and least A.D.  (Met)       Start:  01/22/24    Expected End:  02/11/24    Resolved:  02/12/24         Reduce edema R knee equal to left to allow for full function of the R knee.  (Progressing)       Start:  01/22/24    Expected End:  02/21/24            Pt to be able to ambulate no A.D. FWB R LE with norm al gait pattern  (Met)       Start:  01/22/24    Expected End:  02/21/24    Resolved:  02/22/24         Increase knee flexion to 140 degrees and ext to 0 degrees for gait, stairs, and ADL's.  (Progressing)       Start:  01/22/24    Expected End:  02/21/24            Pt to increase LE strength to grossly 5/5 for improved gait, stairs, lifting and ADL's.  (Progressing)       Start:  01/22/24    Expected End:  03/18/24            Pt to score an increase of 10 points or > on LEFS to display overall increased function.   (Progressing)       Start:  01/22/24    Expected End:  03/18/24            Pt to be independent with a HEP for self management.  (Progressing)       Start:  01/22/24    Expected End:  03/08/24

## 2024-02-26 ENCOUNTER — HOSPITAL ENCOUNTER (OUTPATIENT)
Dept: RADIOLOGY | Facility: CLINIC | Age: 69
Discharge: HOME | End: 2024-02-26
Payer: MEDICARE

## 2024-02-26 DIAGNOSIS — M25.511 PAIN IN RIGHT SHOULDER: ICD-10-CM

## 2024-02-26 DIAGNOSIS — S82.141D DISPLACED BICONDYLAR FRACTURE OF RIGHT TIBIA, SUBSEQUENT ENCOUNTER FOR CLOSED FRACTURE WITH ROUTINE HEALING: ICD-10-CM

## 2024-02-26 PROCEDURE — 73030 X-RAY EXAM OF SHOULDER: CPT | Mod: RT

## 2024-02-26 PROCEDURE — 73562 X-RAY EXAM OF KNEE 3: CPT | Mod: RT

## 2024-02-26 PROCEDURE — 73030 X-RAY EXAM OF SHOULDER: CPT | Mod: RIGHT SIDE | Performed by: RADIOLOGY

## 2024-02-27 ENCOUNTER — TREATMENT (OUTPATIENT)
Dept: PHYSICAL THERAPY | Facility: CLINIC | Age: 69
End: 2024-02-27
Payer: MEDICARE

## 2024-02-27 DIAGNOSIS — M25.661 KNEE STIFF, RIGHT: Primary | ICD-10-CM

## 2024-02-27 DIAGNOSIS — R26.2 DIFFICULTY WALKING: ICD-10-CM

## 2024-02-27 DIAGNOSIS — S82.131D RIGHT MEDIAL TIBIAL PLATEAU FRACTURE, CLOSED, WITH ROUTINE HEALING, SUBSEQUENT ENCOUNTER: ICD-10-CM

## 2024-02-27 PROCEDURE — 97110 THERAPEUTIC EXERCISES: CPT | Mod: GP | Performed by: PHYSICAL THERAPIST

## 2024-02-27 PROCEDURE — 97112 NEUROMUSCULAR REEDUCATION: CPT | Mod: GP | Performed by: PHYSICAL THERAPIST

## 2024-02-27 ASSESSMENT — PAIN SCALES - GENERAL: PAINLEVEL_OUTOF10: 1

## 2024-02-27 ASSESSMENT — PAIN - FUNCTIONAL ASSESSMENT: PAIN_FUNCTIONAL_ASSESSMENT: 0-10

## 2024-02-27 NOTE — PROGRESS NOTES
Physical Therapy    Physical Therapy Treatment    Patient Name: Gely Zamorano  MRN: [id number]  Today's Date: 2/27/2024  Time Calculation  Start Time: 0830  Stop Time: 0915  Time Calculation (min): 45 min  PT Therapeutic Procedures Time Entry  Neuromuscular Re-Education Time Entry: 10  Therapeutic Exercise Time Entry: 35  Payor: MEDICARE / Plan: MEDICARE PART A AND B / Product Type: *No Product type* /     General Comment: Visit 7 of [place]    Current Problem    Problem List Items Addressed This Visit             ICD-10-CM    Difficulty walking R26.2    Knee stiff, right - Primary M25.661     Other Visit Diagnoses         Codes    Right medial tibial plateau fracture, closed, with routine healing, subsequent encounter     S82.131D           Subjective   Pt reports her knee is doing well with only mild discomfort/strain to the R knee. Notes swelling is much better. Notes she did follow up with ortho yesterday and was prescribed therapy for her R glut and for her R shoulder.   Compliance with HEP-yes    Precautions  Precautions  Precautions Comment: WBAT R LE at this time    Pain  Pain Assessment: 0-10  Pain Score: 1  Pain Location: Knee  Pain Orientation: Right    Objective   Lower Extremity Strength:  MMT 5/5 max  RIGHT LEFT   Hip Flexion 5 5   Hip Extension 5 5   Hip Abduction 5 5   Hip Adduction 5 5   Knee Extension 5 5   Knee Flexion 5 5   Ankle DF 5 5   Ankle PF 5 5     Lower Extremity ROM: WNL unless documented below:  ROM in Degrees  RIGHT LEFT   Hip Flexion WNL WNL   Hip Extension WNL WNL   Hip Abduction WNL WNL   Hip Adduction WNL WNL   Knee Extension 0 WNL   Knee Flexion 137 WNL   Ankle DF WNL WNL   Ankle PF WNL  WNL     Gait: no abnormalities noted.    Other Measures  Lower Extremity Funtional Score (LEFS): 72/80     Treatments:  Therapeutic exercise x 35 min   Upright bike x5 min  Standing R HSS x1 min  Standing calf stretch fitter board x1 min  Standing hip abd, ext 2 x 10 BL with RTB   R step up  6'' 2  "x 10  1 to no UE support  Lateral step up 6\" 2 x 10  R step down, reverse step up 6'' 2 x 10   Mini lunges 2 x 10 R Le -   Wall slides small range x10 (pain free)  R LAQ 2# 2x10     Neuromuscular Re-education x 10 min  Airex march x 2 min   Airex tandem stand with YKB CW/CCW x30 sec ea direction  SLS R LE 30\" x 3   Fitter board 2 directions x1 min ea   Wilbur (low) fwd/retro 2x10 , R lead  Wilbur (low) lat 2x10     Updated HEP:  Access Code: [email or web address]  URL: [email or web address]  Date: 02/27/2024  Prepared by: ADRIENNE Hugo    Exercises  - Gastroc Stretch on Wall  - 1 x daily - 7 x weekly - 1 sets - 1 min  hold  - Standing Hamstring Stretch with Step  - 1 x daily - 7 x weekly - 1 sets - 1 min hold  - Heel Raises with Counter Support  - 1 x daily - 4 x weekly - 1-2 sets - 10 reps  - Toe Raises with Counter Support  - 1 x daily - 4 x weekly - 1-2 sets - 10 reps  - Standing Terminal Knee Extension with Resistance  - 1 x daily - 4 x weekly - 1-2 sets - 10 reps - 3-5 sec hold  - Hip Abduction with Resistance Loop  - 1 x daily - 4 x weekly - 2 sets - 10 reps  - Hip Extension with Resistance Loop  - 1 x daily - 4 x weekly - 2 sets - 10 reps  - Standing Hip Flexion with Resistance Loop  - 1 x daily - 4 x weekly - 2 sets - 10 reps  - Step Up (Mirrored)  - 1 x daily - 4 x weekly - 2 sets - 10 reps  - Lateral Step Up  - 1 x daily - 4 x weekly - 2 sets - 10 reps  - Backward Step Up  - 1 x daily - 4 x weekly - 2 sets - 10 reps  - Wall Quarter Squat  - 1 x daily - 4 x weekly - 2 sets - 10 reps  - Standing Partial Lunge  - 1 x daily - 4 x weekly - 2 sets - 10 reps  - Single Leg Stance (Mirrored)  - 1 x daily - 4 x weekly - 1 sets - 3 reps - 30 sec hold  Assessment:  Pt overall tolerated treatment well and is improving well with her knee. Improved ROM, improved strength and overall function is noted.     Plan:  Plan to discharge treatment of the knee to HEP next visit but re-eval for R shoulder and glut. Add new " POC and goals for UE.      Goals:  Resolved       PT Problem       Pt to ambulate with brace donned FWB R LE and least A.D.  (Met)       Start:  01/22/24    Expected End:  02/11/24    Resolved:  02/12/24         Reduce edema R knee equal to left to allow for full function of the R knee.  (Met)       Start:  01/22/24    Expected End:  02/21/24    Resolved:  02/27/24         Pt to be able to ambulate no A.D. FWB R LE with norm al gait pattern  (Met)       Start:  01/22/24    Expected End:  02/21/24    Resolved:  02/22/24         Increase knee flexion to 140 degrees and ext to 0 degrees for gait, stairs, and ADL's.  (Met)       Start:  01/22/24    Expected End:  02/21/24    Resolved:  02/27/24         Pt to increase LE strength to grossly 5/5 for improved gait, stairs, lifting and ADL's.  (Met)       Start:  01/22/24    Expected End:  03/18/24    Resolved:  02/27/24         Pt to score an increase of 10 points or > on LEFS to display overall increased function.   (Met)       Start:  01/22/24    Expected End:  03/18/24    Resolved:  02/27/24         Pt to be independent with a HEP for self management.  (Met)       Start:  01/22/24    Expected End:  03/08/24    Resolved:  02/27/24

## 2024-03-04 ENCOUNTER — TREATMENT (OUTPATIENT)
Dept: PHYSICAL THERAPY | Facility: CLINIC | Age: 69
End: 2024-03-04
Payer: MEDICARE

## 2024-03-04 DIAGNOSIS — S82.131D RIGHT MEDIAL TIBIAL PLATEAU FRACTURE, CLOSED, WITH ROUTINE HEALING, SUBSEQUENT ENCOUNTER: ICD-10-CM

## 2024-03-04 DIAGNOSIS — M25.511 RIGHT SHOULDER PAIN, UNSPECIFIED CHRONICITY: Primary | ICD-10-CM

## 2024-03-04 DIAGNOSIS — M79.18 GLUTEAL PAIN: ICD-10-CM

## 2024-03-04 PROCEDURE — 97164 PT RE-EVAL EST PLAN CARE: CPT | Mod: GP | Performed by: PHYSICAL THERAPIST

## 2024-03-04 PROCEDURE — 97110 THERAPEUTIC EXERCISES: CPT | Mod: GP | Performed by: PHYSICAL THERAPIST

## 2024-03-04 ASSESSMENT — PAIN DESCRIPTION - DESCRIPTORS: DESCRIPTORS: ACHING

## 2024-03-04 ASSESSMENT — PAIN SCALES - GENERAL: PAINLEVEL_OUTOF10: 2

## 2024-03-04 ASSESSMENT — PAIN - FUNCTIONAL ASSESSMENT: PAIN_FUNCTIONAL_ASSESSMENT: 0-10

## 2024-03-04 NOTE — PROGRESS NOTES
Physical Therapy    Physical Therapy Treatment/Re-eval     Patient Name: Gely Zamorano  MRN: [id number]  Today's Date: 3/4/2024  Time Calculation  Start Time: 1045  Stop Time: 1130  Time Calculation (min): 45 min  PT Evaluation Time Entry  PT Re-Evaluation Time Entry: 30  PT Therapeutic Procedures Time Entry  Therapeutic Exercise Time Entry: 15  Payor: MEDICARE / Plan: MEDICARE PART A AND B / Product Type: *No Product type* /     General Comment: Visit 9 of [place]    Current Problem    Problem List Items Addressed This Visit             ICD-10-CM    Right shoulder pain - Primary M25.511    Relevant Orders    Follow Up In Physical Therapy    Gluteal pain M79.18    Relevant Orders    Follow Up In Physical Therapy     Other Visit Diagnoses         Codes    Right medial tibial plateau fracture, closed, with routine healing, subsequent encounter     S82.131D    Relevant Orders    Follow Up In Physical Therapy             Subjective   Pt returned today with new RX for her R shoulder and R glut. Notes injury to the R shoulder when she was using crutches for her R knee in Jan 2024. Notes had injection into the R shoulder by ortho on 2-26-24. Notes pain as constant but better since last week. Note flavia in anterior shoulder and occurs when reaching across body and behind her back. Feels some weakness in the shoulder. Notes some pain that started about 1.5 weeks ago in her R glut as well. Notes pain as constant and worse with stand/walking. Better at rest. Notes pain only in glut, no radiation in the R LE. Previously no issues with the R shoulder with all ADL's and no glut pain with weightbearing activity.     Compliance with HEP- yes for the knee    Precautions  Precautions  Precautions Comment: none    Pain  Pain Assessment: 0-10  Pain Score: 2 (9/10 R glut pain)  Pain Location: Shoulder  Pain Orientation: Right  Pain Descriptors: Aching  Pain Frequency: Intermittent    Objective   Upper Extremity Strength:  MMT 5/5 max   RIGHT LEFT   Shoulder Flexion 148 pain  160   Shoulder Abduction 165 pain WNL   Shoulder ER 75 at 90 abd  pain  WNL   Shoulder IR T7 WNL   HIP: ROM WNL flex, abd, ext IR, mild tightness into ER    Upper Extremity ROM: WNL unless documented below:  ROM in Degrees RIGHT LEFT   Shoulder Flexion 4 5   Shoulder Abduction 4 5   Shoulder ER 4- 4+   Shoulder IR 4+ 5   HIP Strength MMT: 4+ to 5/5 grossly throughout    Posture: Fair, rounded shoulders.   Palpation: Supraspinatus tendon painful to palpation. Piriformis muscle very tender to palpate R glut region.     Special tests:  SHOULDER RIGHT LEFT   [place] +    Neer Impingement  +    Empty Can +    Infraspinatus muscle test -    Lateral Cl  +    Hip scour (-) hip labral tests (-) R LE    Other Measures  Disability of Arm Shoulder Hand (DASH): 38.64    Treatments:  Re-eval completed for adding shoulder and glut pain to POC. discharge knee POC.   There-ex:  UBE x 5 min  Shoulder flex at wall 10 reps 5 sec hold  Shoulder ER stretch at wall 5 sec hold 10 reps    T-band rows GTB 2 x 10   T-band ext GTB 2 x 10    Current [facility]  Access Code: [email or web address]  URL: [email or web address]  Date: 03/04/2024  Prepared by: ADRIENNE Hugo    Exercises  - Supine Piriformis Stretch with Leg Straight  - 2 x daily - 7 x weekly - 1 sets - 3 reps - 20 sec hold  - Supine Piriformis Stretch  - 1 x daily - 7 x weekly - 1 sets - 3 reps - 30 sec hold  - Shoulder Flexion Wall Slide with Towel  - 1 x daily - 7 x weekly - 1 sets - 10 reps - 5 sec hold  - Standing Shoulder External Rotation Stretch at Wall (Mirrored)  - 1 x daily - 7 x weekly - 1 sets - 10 reps - 5 sec hold  - Standing Shoulder Row with Anchored Resistance  - 1 x daily - 7 x weekly - 2 sets - 10 reps  - Shoulder extension with resistance - Neutral  - 1 x daily - 7 x weekly - 2 sets - 10 reps    Assessment:  Pt overall has achieved all goals for her R knee and POC will be D/C'ed at this time. Pt does return today  with RX for shoulder pain and R glut pain. Pt presents with shoulder pain, loss of motion, loss of strength and overall reduced shoulder function as well as piriformis muscle pain that is affecting her gait. This patient would benefit from continued therapy with focus on her R shoulder and R glut to restore prior levels of function in these affected areas.      Prognosis is good to achieve new goals established for the shoulder and glut.   No barriers at this time to pt's care.     Plan:  Continue at 2x/wk x 4 weeks for the R shoulder and glut pain    Pt to continue with there-ex, neuro re-ed, manual tx and a HEP.     Goals:  Physical Therapy - R knee fx Problems       Physical Therapy - R knee fx Problems (Resolved)       PT Problem       Pt to ambulate with brace donned FWB R LE and least A.D.  (Met)       Start:  01/22/24    Expected End:  02/11/24    Resolved:  02/12/24         Reduce edema R knee equal to left to allow for full function of the R knee.  (Met)       Start:  01/22/24    Expected End:  02/21/24    Resolved:  02/27/24         Pt to be able to ambulate no A.D. FWB R LE with norm al gait pattern  (Met)       Start:  01/22/24    Expected End:  02/21/24    Resolved:  02/22/24         Increase knee flexion to 140 degrees and ext to 0 degrees for gait, stairs, and ADL's.  (Met)       Start:  01/22/24    Expected End:  02/21/24    Resolved:  02/27/24         Pt to increase LE strength to grossly 5/5 for improved gait, stairs, lifting and ADL's.  (Met)       Start:  01/22/24    Expected End:  03/18/24    Resolved:  02/27/24         Pt to score an increase of 10 points or > on LEFS to display overall increased function.   (Met)       Start:  01/22/24    Expected End:  03/18/24    Resolved:  02/27/24         Pt to be independent with a HEP for self management.  (Met)       Start:  01/22/24    Expected End:  03/08/24    Resolved:  02/27/24              R shoudler  Problems       R shoudler  Problems (Active)        PT Problem       Reduce pain at worst to 2/10 in the R glut with all prior activity.        Start:  03/04/24    Expected End:  03/19/24            Pt to sit with good posture approx 50-75% of the time.        Start:  03/04/24    Expected End:  03/19/24            Reduce pain at worst to 0-1/10 in the R shoulder and glut with all prior activity.        Start:  03/04/24    Expected End:  04/15/24            Increase shoulder AROM (in degrees) flex to 165, Abd 165, ER 90, IR 65 for lifting, ADL's, reaching and self care.        Start:  03/04/24    Expected End:  04/15/24            Pt to have increased shoulder and scapular strength by 1/2 to full MMT grade for improved ADL's, lifting and postural support.        Start:  03/04/24    Expected End:  04/15/24            Pt to decrease quick DASH score by 10-15% to display overall improved shoulder function.        Start:  03/04/24    Expected End:  04/15/24            Pt to be independent with a HEP for shoulder for self management.        Start:  03/04/24    Expected End:  04/15/24

## 2024-03-04 NOTE — PROGRESS NOTES
Physical Therapy    Physical Therapy Treatment    Patient Name: Gely Zamorano  MRN: [id number]  Today's Date: 3/4/2024                          Payor: MEDICARE / Plan: MEDICARE PART A AND B / Product Type: *No Product type* /     General Comment: Visit 9 of [place]    Current Problem    Problem List Items Addressed This Visit             ICD-10-CM    Difficulty walking R26.2    Knee stiff, right M25.661     Other Visit Diagnoses         Codes    Right medial tibial plateau fracture, closed, with routine healing, subsequent encounter     S82.131D             Subjective   Pt returned today with new RX for her R shoulder and R glut. Notes injury to the R shoulder when she was using crutches for her R knee in Jan 2024. Notes had injection into the R shoulder by ortho on 2-26-24. Notes pain as constant but better since last week. Note flavia in anterior shoulder and occurs when reaching across body and behind her back. Feels some weakness in the shoulder. Notes some pain that started about 1.5 weeks ago in her R glut as well. Notes pain as constant and worse with stand/walking. Better at rest. Notes pain only in glut, no radiation in the R LE.     Compliance with HEP- yes for the knee    Precautions  Precautions  Precautions Comment: none    Pain  Pain Assessment: 0-10  Pain Score: 2 (9/10 R glut pain)  Pain Location: Shoulder  Pain Orientation: Right  Pain Descriptors: Aching  Pain Frequency: Intermittent      Objective   Upper Extremity Strength:  MMT 5/5 max  RIGHT LEFT   Shoulder Flexion     Shoulder Abduction     Shoulder ER     Shoulder IR     Elbow Flexion     Elbow Extension     Wrist Flexion     Wrist Extension       Upper Extremity ROM: WNL unless documented below:  ROM in Degrees RIGHT LEFT   Shoulder Flexion     Shoulder Abduction     Shoulder ER     Shoulder IR     Elbow Flexion     Elbow Extension     Wrist Flexion     Wrist Extension       Joint mobility ***  Posture ***  Palpation ***  Neurological symptoms  ***    {PT Outcome Measures:12646}    Special tests:  SHOULDER RIGHT LEFT   [place]     Neer Impingement      Drop arm     Infraspinatus muscle test     ER lag sign     Lift off     Apprehension     Relocation     Cross-body adduction     Jerk test         Treatments:      Assessment:       Plan:       Goals:  Resolved       PT Problem       Pt to ambulate with brace donned FWB R LE and least A.D.  (Met)       Start:  01/22/24    Expected End:  02/11/24    Resolved:  02/12/24         Reduce edema R knee equal to left to allow for full function of the R knee.  (Met)       Start:  01/22/24    Expected End:  02/21/24    Resolved:  02/27/24         Pt to be able to ambulate no A.D. FWB R LE with norm al gait pattern  (Met)       Start:  01/22/24    Expected End:  02/21/24    Resolved:  02/22/24         Increase knee flexion to 140 degrees and ext to 0 degrees for gait, stairs, and ADL's.  (Met)       Start:  01/22/24    Expected End:  02/21/24    Resolved:  02/27/24         Pt to increase LE strength to grossly 5/5 for improved gait, stairs, lifting and ADL's.  (Met)       Start:  01/22/24    Expected End:  03/18/24    Resolved:  02/27/24         Pt to score an increase of 10 points or > on LEFS to display overall increased function.   (Met)       Start:  01/22/24    Expected End:  03/18/24    Resolved:  02/27/24         Pt to be independent with a HEP for self management.  (Met)       Start:  01/22/24    Expected End:  03/08/24    Resolved:  02/27/24

## 2024-03-06 ENCOUNTER — TREATMENT (OUTPATIENT)
Dept: PHYSICAL THERAPY | Facility: CLINIC | Age: 69
End: 2024-03-06
Payer: MEDICARE

## 2024-03-06 DIAGNOSIS — S82.131D RIGHT MEDIAL TIBIAL PLATEAU FRACTURE, CLOSED, WITH ROUTINE HEALING, SUBSEQUENT ENCOUNTER: ICD-10-CM

## 2024-03-06 DIAGNOSIS — M79.18 GLUTEAL PAIN: ICD-10-CM

## 2024-03-06 DIAGNOSIS — M25.511 RIGHT SHOULDER PAIN, UNSPECIFIED CHRONICITY: ICD-10-CM

## 2024-03-06 PROCEDURE — 97110 THERAPEUTIC EXERCISES: CPT | Mod: GP,CQ

## 2024-03-06 PROCEDURE — 97140 MANUAL THERAPY 1/> REGIONS: CPT | Mod: GP,CQ

## 2024-03-06 ASSESSMENT — PAIN - FUNCTIONAL ASSESSMENT: PAIN_FUNCTIONAL_ASSESSMENT: 0-10

## 2024-03-06 ASSESSMENT — PAIN DESCRIPTION - DESCRIPTORS: DESCRIPTORS: ACHING

## 2024-03-06 NOTE — PROGRESS NOTES
Physical Therapy    Physical Therapy Treatment/Re-eval     Patient Name: Gely Zamorano  MRN: [id number]  Today's Date: 3/6/2024  Time Calculation  Start Time: 0830  Stop Time: 0915  Time Calculation (min): 45 min     PT Therapeutic Procedures Time Entry  Manual Therapy Time Entry: 25  Therapeutic Exercise Time Entry: 20  Payor: MEDICARE / Plan: MEDICARE PART A AND B / Product Type: *No Product type* /     General Comment: Visit 10 of [place]    Current Problem    Problem List Items Addressed This Visit             ICD-10-CM    Right shoulder pain M25.511    Gluteal pain M79.18     Other Visit Diagnoses         Codes    Right medial tibial plateau fracture, closed, with routine healing, subsequent encounter     S82.131D               Subjective   Pt reports she walked a mile last night and it was too much. Pain was 10/10 In center of R glute. Pain diminished after she sat down. Manual to R glute last session seemed to give her some relief.   Shoulder  HEP is going well.   Precautions  Precautions  Precautions Comment: none    Pain  Pain Assessment: 0-10  Pain Location: Shoulder  Pain Orientation: Right  Pain Descriptors: Aching  Pain Frequency: Intermittent  R hip/glute 0/10 aching, sharp     Objective   Upper Extremity Strength:  MMT 5/5 max  RIGHT LEFT   Shoulder Flexion 148 pain  160   Shoulder Abduction 165 pain WNL   Shoulder ER 75 at 90 abd  pain  WNL   Shoulder IR T7 WNL   HIP: ROM WNL flex, abd, ext IR, mild tightness into ER    Upper Extremity ROM: WNL unless documented below:  ROM in Degrees RIGHT LEFT   Shoulder Flexion 4 5   Shoulder Abduction 4 5   Shoulder ER 4- 4+   Shoulder IR 4+ 5   HIP Strength MMT: 4+ to 5/5 grossly throughout    Posture: Fair, rounded shoulders.   Palpation: Supraspinatus tendon painful to palpation. Piriformis muscle very tender to palpate R glut region.     Special tests:  SHOULDER RIGHT LEFT   [place] +    Neer Impingement  +    Empty Can +    Infraspinatus muscle test  -    Lateral Cl  +             Treatments:  There-ex:  Nustep LE/UE x 5 min  R piriformis stretch x1 min, seated  R HSS x1 min, seated  R clamshell 2x10 GTB   R reverse clamshell 2x10 GTB  Bridge 5 sec hold 2x10 increase challenge next visit    Shoulder flex at wall RSB 10 reps 5 sec hold  Shoulder ER stretch at wall 5 sec hold 10 reps    R shoulder ER OTB 2x10     Manual:  R LE TPR/piriformis release  R TPR to R supraspinatus     Modalities:  Declined     Current HEP  Access Code: [email or web address]  URL: [email or web address]  Date: 03/04/2024  Prepared by:  [place]    Exercises  - Supine Piriformis Stretch with Leg Straight  - 2 x daily - 7 x weekly - 1 sets - 3 reps - 20 sec hold  - Supine Piriformis Stretch  - 1 x daily - 7 x weekly - 1 sets - 3 reps - 30 sec hold  - Shoulder Flexion Wall Slide with Towel  - 1 x daily - 7 x weekly - 1 sets - 10 reps - 5 sec hold  - Standing Shoulder External Rotation Stretch at Wall (Mirrored)  - 1 x daily - 7 x weekly - 1 sets - 10 reps - 5 sec hold  - Standing Shoulder Row with Anchored Resistance  - 1 x daily - 7 x weekly - 2 sets - 10 reps  - Shoulder extension with resistance - Neutral  - 1 x daily - 7 x weekly - 2 sets - 10 reps    Assessment:  Pt tolerated therex well without complaints of pain.   TTP along R piriformis with manual. Positive response with improved tissue mobility and reduced tenderness following. Pt voiced relief with piriformis stretch following manual.   Radiating trigger point in supraspinatus to anterior shoulder. Positive response with decreased pain with shoulder AROM following.       Plan:  Pt to continue with there-ex, neuro re-ed, manual tx and a HEP.     Goals:  Physical Therapy - R knee fx Problems       Physical Therapy - R knee fx Problems (Resolved)       PT Problem       Pt to ambulate with brace donned FWB R LE and least A.D.  (Met)       Start:  01/22/24    Expected End:  02/11/24    Resolved:  02/12/24         Reduce  edema R knee equal to left to allow for full function of the R knee.  (Met)       Start:  01/22/24    Expected End:  02/21/24    Resolved:  02/27/24         Pt to be able to ambulate no A.D. FWB R LE with norm al gait pattern  (Met)       Start:  01/22/24    Expected End:  02/21/24    Resolved:  02/22/24         Increase knee flexion to 140 degrees and ext to 0 degrees for gait, stairs, and ADL's.  (Met)       Start:  01/22/24    Expected End:  02/21/24    Resolved:  02/27/24         Pt to increase LE strength to grossly 5/5 for improved gait, stairs, lifting and ADL's.  (Met)       Start:  01/22/24    Expected End:  03/18/24    Resolved:  02/27/24         Pt to score an increase of 10 points or > on LEFS to display overall increased function.   (Met)       Start:  01/22/24    Expected End:  03/18/24    Resolved:  02/27/24         Pt to be independent with a HEP for self management.  (Met)       Start:  01/22/24    Expected End:  03/08/24    Resolved:  02/27/24              R shoudler  Problems       R shoudler  Problems (Active)       PT Problem       Reduce pain at worst to 2/10 in the R glut with all prior activity.        Start:  03/04/24    Expected End:  03/19/24            Pt to sit with good posture approx 50-75% of the time.        Start:  03/04/24    Expected End:  03/19/24            Reduce pain at worst to 0-1/10 in the R shoulder and glut with all prior activity.        Start:  03/04/24    Expected End:  04/15/24            Increase shoulder AROM (in degrees) flex to 165, Abd 165, ER 90, IR 65 for lifting, ADL's, reaching and self care.        Start:  03/04/24    Expected End:  04/15/24            Pt to have increased shoulder and scapular strength by 1/2 to full MMT grade for improved ADL's, lifting and postural support.        Start:  03/04/24    Expected End:  04/15/24            Pt to decrease quick DASH score by 10-15% to display overall improved shoulder function.        Start:  03/04/24     Expected End:  04/15/24            Pt to be independent with a HEP for shoulder for self management.        Start:  03/04/24    Expected End:  04/15/24

## 2024-03-12 ENCOUNTER — APPOINTMENT (OUTPATIENT)
Dept: PHYSICAL THERAPY | Facility: CLINIC | Age: 69
End: 2024-03-12
Payer: MEDICARE

## 2024-03-14 ENCOUNTER — APPOINTMENT (OUTPATIENT)
Dept: PHYSICAL THERAPY | Facility: CLINIC | Age: 69
End: 2024-03-14
Payer: MEDICARE

## 2024-03-19 ENCOUNTER — TREATMENT (OUTPATIENT)
Dept: PHYSICAL THERAPY | Facility: CLINIC | Age: 69
End: 2024-03-19
Payer: MEDICARE

## 2024-03-19 ENCOUNTER — LAB (OUTPATIENT)
Dept: LAB | Facility: LAB | Age: 69
End: 2024-03-19
Payer: MEDICARE

## 2024-03-19 DIAGNOSIS — M79.18 GLUTEAL PAIN: ICD-10-CM

## 2024-03-19 DIAGNOSIS — M25.511 RIGHT SHOULDER PAIN, UNSPECIFIED CHRONICITY: ICD-10-CM

## 2024-03-19 DIAGNOSIS — E03.9 ACQUIRED HYPOTHYROIDISM: ICD-10-CM

## 2024-03-19 DIAGNOSIS — S82.131D RIGHT MEDIAL TIBIAL PLATEAU FRACTURE, CLOSED, WITH ROUTINE HEALING, SUBSEQUENT ENCOUNTER: ICD-10-CM

## 2024-03-19 PROCEDURE — 97110 THERAPEUTIC EXERCISES: CPT | Mod: GP | Performed by: PHYSICAL THERAPIST

## 2024-03-19 PROCEDURE — 36415 COLL VENOUS BLD VENIPUNCTURE: CPT

## 2024-03-19 PROCEDURE — 84443 ASSAY THYROID STIM HORMONE: CPT

## 2024-03-19 ASSESSMENT — PAIN - FUNCTIONAL ASSESSMENT: PAIN_FUNCTIONAL_ASSESSMENT: 0-10

## 2024-03-19 ASSESSMENT — PAIN SCALES - GENERAL: PAINLEVEL_OUTOF10: 0 - NO PAIN

## 2024-03-19 NOTE — PROGRESS NOTES
Physical Therapy    Physical Therapy Treatment    Patient Name: eGly Zamorano  MRN: [id number]  Today's Date: 3/19/2024  Time Calculation  Start Time: 0918  Stop Time: 1000  Time Calculation (min): 42 min     PT Therapeutic Procedures Time Entry  Manual Therapy Time Entry: 4  Therapeutic Exercise Time Entry: 38  Payor: MEDICARE / Plan: MEDICARE PART A AND B / Product Type: *No Product type* /     General Comment: Visit 11 of [place]    Current Problem    Problem List Items Addressed This Visit             ICD-10-CM    Right shoulder pain M25.511    Gluteal pain M79.18     Other Visit Diagnoses         Codes    Right medial tibial plateau fracture, closed, with routine healing, subsequent encounter     S82.131D                 Subjective   Pt reports she was in [place] last week. She did well while on vacation. She did exercises in the pool. Pt reports piriformis pain is gone. Reports her shoulder pain comes and goes. Her shoulder is he biggest complaint currently.    Precautions  Precautions  Precautions Comment: none    Pain  Pain Assessment: 0-10  Pain Score: 0 - No pain  R hip/glute 0/10 aching, sharp     Objective   Measurements taken 3/4/24  Upper Extremity Strength:  MMT 5/5 max  RIGHT LEFT   Shoulder Flexion 148 pain  160   Shoulder Abduction 165 pain WNL   Shoulder ER 75 at 90 abd  pain  WNL   Shoulder IR T7 WNL   HIP: ROM WNL flex, abd, ext IR, mild tightness into ER    Upper Extremity ROM: WNL unless documented below:  ROM in Degrees RIGHT LEFT   Shoulder Flexion 4 5   Shoulder Abduction 4 5   Shoulder ER 4- 4+   Shoulder IR 4+ 5   HIP Strength MMT: 4+ to 5/5 grossly throughout    Posture: Fair, rounded shoulders.   Palpation: Supraspinatus tendon painful to palpation. Piriformis muscle very tender to palpate R glut region.     Special tests:  SHOULDER RIGHT LEFT   [place] +    Neer Impingement  +    Empty Can +    Infraspinatus muscle test -    Lateral Cl  +              Treatments:  There-ex:  Nustep LE/UE x 5 min  Pulleys flex, abd, IR x 1 min   Scaption/abduction YTB x 10   ER/IR YTB 2x10  Wall ball walk RSB x 10-discontinue after this visit.     R piriformis stretch x1 min, seated  R HSS x1 min, seated  R clamshell 2x10 GTB   R reverse clamshell 2x10 GTB  Bridge with hip abd BTB 5 sec hold 2x10     Manual:  R LE TPR/piriformis release  R TPR to R supraspinatus-held due to time     Modalities:  Declined     Current HEP  Access Code: [email or web address]  URL: [email or web address]  Date: 03/04/2024  Prepared by:  Bethel    Exercises  - Supine Piriformis Stretch with Leg Straight  - 2 x daily - 7 x weekly - 1 sets - 3 reps - 20 sec hold  - Supine Piriformis Stretch  - 1 x daily - 7 x weekly - 1 sets - 3 reps - 30 sec hold  - Shoulder Flexion Wall Slide with Towel  - 1 x daily - 7 x weekly - 1 sets - 10 reps - 5 sec hold  - Standing Shoulder External Rotation Stretch at Wall (Mirrored)  - 1 x daily - 7 x weekly - 1 sets - 10 reps - 5 sec hold  - Standing Shoulder Row with Anchored Resistance  - 1 x daily - 7 x weekly - 2 sets - 10 reps  - Shoulder extension with resistance - Neutral  - 1 x daily - 7 x weekly - 2 sets - 10 reps    Assessment:  Focused therex on shoulder today since piriformis pain resolved. Pt noted fatigue with shoulder abduction strengthening. She also reported strain with shoulder IR with pulleys. Tolerated bridge progression well. Overall great effort and tolerance today.       Plan:  Pt to continue with there-ex, neuro re-ed, manual tx and a HEP.     Goals:  Physical Therapy - R knee fx Problems       Physical Therapy - R knee fx Problems (Resolved)       PT Problem       Pt to ambulate with brace donned FWB R LE and least A.D.  (Met)       Start:  01/22/24    Expected End:  02/11/24    Resolved:  02/12/24         Reduce edema R knee equal to left to allow for full function of the R knee.  (Met)       Start:  01/22/24    Expected End:   02/21/24    Resolved:  02/27/24         Pt to be able to ambulate no A.D. FWB R LE with norm al gait pattern  (Met)       Start:  01/22/24    Expected End:  02/21/24    Resolved:  02/22/24         Increase knee flexion to 140 degrees and ext to 0 degrees for gait, stairs, and ADL's.  (Met)       Start:  01/22/24    Expected End:  02/21/24    Resolved:  02/27/24         Pt to increase LE strength to grossly 5/5 for improved gait, stairs, lifting and ADL's.  (Met)       Start:  01/22/24    Expected End:  03/18/24    Resolved:  02/27/24         Pt to score an increase of 10 points or > on LEFS to display overall increased function.   (Met)       Start:  01/22/24    Expected End:  03/18/24    Resolved:  02/27/24         Pt to be independent with a HEP for self management.  (Met)       Start:  01/22/24    Expected End:  03/08/24    Resolved:  02/27/24              R shoudler  Problems       R shoudler  Problems (Active)       PT Problem       Reduce pain at worst to 2/10 in the R glut with all prior activity.        Start:  03/04/24    Expected End:  03/19/24            Pt to sit with good posture approx 50-75% of the time.        Start:  03/04/24    Expected End:  03/19/24            Reduce pain at worst to 0-1/10 in the R shoulder and glut with all prior activity.        Start:  03/04/24    Expected End:  04/15/24            Increase shoulder AROM (in degrees) flex to 165, Abd 165, ER 90, IR 65 for lifting, ADL's, reaching and self care.        Start:  03/04/24    Expected End:  04/15/24            Pt to have increased shoulder and scapular strength by 1/2 to full MMT grade for improved ADL's, lifting and postural support.        Start:  03/04/24    Expected End:  04/15/24            Pt to decrease quick DASH score by 10-15% to display overall improved shoulder function.        Start:  03/04/24    Expected End:  04/15/24            Pt to be independent with a HEP for shoulder for self management.        Start:   03/04/24    Expected End:  04/15/24

## 2024-03-20 LAB — TSH SERPL-ACNC: 0.59 MIU/L (ref 0.44–3.98)

## 2024-03-21 ENCOUNTER — TREATMENT (OUTPATIENT)
Dept: PHYSICAL THERAPY | Facility: CLINIC | Age: 69
End: 2024-03-21
Payer: MEDICARE

## 2024-03-21 DIAGNOSIS — M79.18 GLUTEAL PAIN: ICD-10-CM

## 2024-03-21 DIAGNOSIS — M25.511 RIGHT SHOULDER PAIN, UNSPECIFIED CHRONICITY: Primary | ICD-10-CM

## 2024-03-21 DIAGNOSIS — S82.131D RIGHT MEDIAL TIBIAL PLATEAU FRACTURE, CLOSED, WITH ROUTINE HEALING, SUBSEQUENT ENCOUNTER: ICD-10-CM

## 2024-03-21 PROCEDURE — 97110 THERAPEUTIC EXERCISES: CPT | Mod: GP,KX | Performed by: PHYSICAL THERAPIST

## 2024-03-21 PROCEDURE — 97140 MANUAL THERAPY 1/> REGIONS: CPT | Mod: GP,KX | Performed by: PHYSICAL THERAPIST

## 2024-03-21 ASSESSMENT — PAIN SCALES - GENERAL: PAINLEVEL_OUTOF10: 1

## 2024-03-21 NOTE — PROGRESS NOTES
Physical Therapy    Physical Therapy Treatment    Patient Name: Gely Zamorano  MRN: [id number]  Today's Date: 3/21/2024  Time Calculation  Start Time: 0916  Stop Time: 0956  Time Calculation (min): 40 min  PT Therapeutic Procedures Time Entry  Manual Therapy Time Entry: 10  Therapeutic Exercise Time Entry: 30  Payor: MEDICARE / Plan: MEDICARE PART A AND B / Product Type: *No Product type* /     General Comment: Visit 12 of [place]    Current Problem    Problem List Items Addressed This Visit             ICD-10-CM    Right shoulder pain - Primary M25.511    Gluteal pain M79.18     Other Visit Diagnoses         Codes    Right medial tibial plateau fracture, closed, with routine healing, subsequent encounter     S82.131D           Subjective   Pt overall reports she is doing well with no ain in the R hip but still some mild pain in the shoulder.   Compliance with HEP-yes    Precautions  Precautions  Precautions Comment: none    Pain  Pain Score: 1  Pain Location: Shoulder  Pain Orientation: Right    Objective   No measures today     Treatments:  There-ex:  Nustep LE/UE x 5 min  Pulleys flex, abd, IR x 1 min   Scaption/abduction YTB 2 x 10   ER/IR YTB 2x10  Wall ball walk RSB x 10-discontinue after this visit.     R piriformis stretch x1 min, seated  R HSS x1 min, seated  R clamshell 2x10 GTB   R reverse clamshell 2x10 GTB  Bridge with hip abd BTB 5 sec hold 2x10 -held     Manual:  TPR R shoulder deltoid  R shoulder GH jt mobs Inf, AP and LAD Grade 2-3     Current HEP  Access Code: [email or web address]  URL: [email or web address]  Date: 03/21/2024  Prepared by:  Bethel    Exercises  - Supine Piriformis Stretch with Leg Straight  - 2 x daily - 7 x weekly - 1 sets - 3 reps - 20 sec hold  - Supine Piriformis Stretch  - 1 x daily - 7 x weekly - 1 sets - 3 reps - 30 sec hold  - Shoulder Flexion Wall Slide with Towel  - 1 x daily - 7 x weekly - 1 sets - 10 reps - 5 sec hold  - Standing Shoulder External Rotation  Stretch at Wall (Mirrored)  - 1 x daily - 7 x weekly - 1 sets - 10 reps - 5 sec hold  - Standing Shoulder Row with Anchored Resistance  - 1 x daily - 7 x weekly - 2 sets - 10 reps  - Shoulder extension with resistance - Neutral  - 1 x daily - 7 x weekly - 2 sets - 10 reps  - Seated Shoulder Flexion AAROM with Pulley Behind  - 1 x daily - 7 x weekly  - Seated Shoulder Abduction AAROM with Pulley Behind  - 1 x daily - 7 x weekly  - Standing Shoulder Internal Rotation AAROM with Pulley  - 1 x daily - 7 x weekly    Assessment:  Pt overall tolerated treatment well and is progressing very well with her shoulder and hip. Noted no pain in R hip at this time and minimal pain in the shoulder. Pt will be seen one more visit then discharge to ongoing HEP.     Plan:  One more visit then discharge to ongoing HEP.     Goals:  Physical Therapy - R knee fx Problems       Physical Therapy - R knee fx Problems (Resolved)       PT Problem       Pt to ambulate with brace donned FWB R LE and least A.D.  (Met)       Start:  01/22/24    Expected End:  02/11/24    Resolved:  02/12/24         Reduce edema R knee equal to left to allow for full function of the R knee.  (Met)       Start:  01/22/24    Expected End:  02/21/24    Resolved:  02/27/24         Pt to be able to ambulate no A.D. FWB R LE with norm al gait pattern  (Met)       Start:  01/22/24    Expected End:  02/21/24    Resolved:  02/22/24         Increase knee flexion to 140 degrees and ext to 0 degrees for gait, stairs, and ADL's.  (Met)       Start:  01/22/24    Expected End:  02/21/24    Resolved:  02/27/24         Pt to increase LE strength to grossly 5/5 for improved gait, stairs, lifting and ADL's.  (Met)       Start:  01/22/24    Expected End:  03/18/24    Resolved:  02/27/24         Pt to score an increase of 10 points or > on LEFS to display overall increased function.   (Met)       Start:  01/22/24    Expected End:  03/18/24    Resolved:  02/27/24         Pt to be  independent with a HEP for self management.  (Met)       Start:  01/22/24    Expected End:  03/08/24    Resolved:  02/27/24              R shoudler  Problems       R shoudler  Problems (Active)       PT Problem       Reduce pain at worst to 2/10 in the R glut with all prior activity.        Start:  03/04/24    Expected End:  03/19/24            Pt to sit with good posture approx 50-75% of the time.        Start:  03/04/24    Expected End:  03/19/24            Reduce pain at worst to 0-1/10 in the R shoulder and glut with all prior activity.        Start:  03/04/24    Expected End:  04/15/24            Increase shoulder AROM (in degrees) flex to 165, Abd 165, ER 90, IR 65 for lifting, ADL's, reaching and self care.        Start:  03/04/24    Expected End:  04/15/24            Pt to have increased shoulder and scapular strength by 1/2 to full MMT grade for improved ADL's, lifting and postural support.        Start:  03/04/24    Expected End:  04/15/24            Pt to decrease quick DASH score by 10-15% to display overall improved shoulder function.        Start:  03/04/24    Expected End:  04/15/24            Pt to be independent with a HEP for shoulder for self management.        Start:  03/04/24    Expected End:  04/15/24

## 2024-03-26 ENCOUNTER — APPOINTMENT (OUTPATIENT)
Dept: PHYSICAL THERAPY | Facility: CLINIC | Age: 69
End: 2024-03-26
Payer: MEDICARE

## 2024-03-28 ENCOUNTER — TREATMENT (OUTPATIENT)
Dept: PHYSICAL THERAPY | Facility: CLINIC | Age: 69
End: 2024-03-28
Payer: MEDICARE

## 2024-03-28 DIAGNOSIS — M79.18 GLUTEAL PAIN: ICD-10-CM

## 2024-03-28 DIAGNOSIS — S82.131D RIGHT MEDIAL TIBIAL PLATEAU FRACTURE, CLOSED, WITH ROUTINE HEALING, SUBSEQUENT ENCOUNTER: ICD-10-CM

## 2024-03-28 DIAGNOSIS — M25.511 RIGHT SHOULDER PAIN, UNSPECIFIED CHRONICITY: Primary | ICD-10-CM

## 2024-03-28 PROCEDURE — 97110 THERAPEUTIC EXERCISES: CPT | Mod: GP,KX | Performed by: PHYSICAL THERAPIST

## 2024-03-28 ASSESSMENT — PAIN SCALES - GENERAL: PAINLEVEL_OUTOF10: 0 - NO PAIN

## 2024-03-28 NOTE — PROGRESS NOTES
Physical Therapy    Physical Therapy Treatment/Discharge     Patient Name: Gely Zamoarno  MRN: [id number]  Today's Date: 3/28/2024  Time Calculation  Start Time: 0915  Stop Time: 1000  Time Calculation (min): 45 min  PT Therapeutic Procedures Time Entry  Therapeutic Exercise Time Entry: 40  Payor: MEDICARE / Plan: MEDICARE PART A AND B / Product Type: *No Product type* /     General Comment: Visit 13 of [place]    Current Problem    Problem List Items Addressed This Visit             ICD-10-CM    Right shoulder pain - Primary M25.511    Gluteal pain M79.18     Other Visit Diagnoses         Codes    Right medial tibial plateau fracture, closed, with routine healing, subsequent encounter     S82.131D           Subjective   Pt overall feels she is doing well with her hip, knee and shoulder. Notes minimal pain.  Pt note sshe is about 80% of her normal at this time.   Compliance with HEP-yes    Precautions  Precautions  Precautions Comment: none    Pain  Pain Score: 0 - No pain (1/10 at worst)  Pain Location: Shoulder  Pain Orientation: Right    Objective   Upper Extremity Strength:  MMT 5/5 max  RIGHT LEFT   Shoulder Flexion 175 160   Shoulder Abduction 170 WNL   Shoulder ER 85 at 90   WNL   Shoulder IR WNL WNL   HIP: ROM WNL flex, abd, ext IR, mild tightness into ER    Upper Extremity ROM: WNL unless documented below:  ROM in Degrees RIGHT LEFT   Shoulder Flexion 5 5   Shoulder Abduction 5 5   Shoulder ER 5 5   Shoulder IR 5 5   HIP Strength MMT: 5/5 grossly throughout    Posture: Fair, rounded shoulders.   Palpation: Supraspinatus tendon painful to palpation. Piriformis muscle very tender to palpate R glut region.     Other Measures  Disability of Arm Shoulder Hand (DASH): 22.73    Treatments:  There-ex:  Nustep LE/UE x 5 min  Pulleys flex, abd, IR x 1 min   Scaption/abduction YTB 2 x 10   ER/IR YTB 2 x 10    R piriformis stretch x1 min, seated  R HSS x1 min, seated  R clamshell 2x10 GTB   R reverse clamshell 2x10  GTB  Bridge with hip abd BTB 5 sec hold 2x10 -held     Objective measures today     Updated HEP  Access Code: [email or web address]  URL: [email or web address]  Date: 03/28/2024  Prepared by: ADRIENNE Hugo    Exercises  - Supine Piriformis Stretch with Leg Straight  - 2 x daily - 7 x weekly - 1 sets - 3 reps - 20 sec hold  - Shoulder Flexion Wall Slide with Towel  - 1 x daily - 7 x weekly - 1 sets - 10 reps - 5 sec hold  - Standing Shoulder External Rotation Stretch at Wall (Mirrored)  - 1 x daily - 7 x weekly - 1 sets - 10 reps - 5 sec hold  - Standing Shoulder Row with Anchored Resistance  - 1 x daily - 7 x weekly - 2 sets - 10 reps  - Shoulder extension with resistance - Neutral  - 1 x daily - 7 x weekly - 2 sets - 10 reps  - Seated Shoulder Flexion AAROM with Pulley Behind  - 1 x daily - 7 x weekly  - Seated Shoulder Abduction AAROM with Pulley Behind  - 1 x daily - 7 x weekly  - Standing Shoulder Internal Rotation AAROM with Pulley  - 1 x daily - 7 x weekly  - Standing Shoulder Abduction with Resistance  - 1 x daily - 7 x weekly - 2 sets - 10 reps  - Standing Shoulder Flexion with Resistance  - 1 x daily - 7 x weekly - 2 sets - 10 reps  - Seated Piriformis Stretch  - 1 x daily - 7 x weekly - 1 sets - 1 reps - 60 sec hold  - Clam with Resistance  - 1 x daily - 7 x weekly - 2 sets - 10 reps  - Sidelying Reverse Clamshell (Mirrored)  - 1 x daily - 7 x weekly - 2 sets - 10 reps    Assessment:  Pt overall has progressed well with her shoulder and hip with minimal to no pain at this time. Pt has progressed as well with her function with minimal limitations. Pt has met approx 100% of her LTG's and is recommended to discharge from therapy at this time. Pt has a good prognosis going forward with her HEP performance.      Plan:  Discharge to [facility].     Goals:  Physical Therapy - R knee fx Problems       Physical Therapy - R knee fx Problems (Resolved)       PT Problem       Pt to ambulate with brace donned FWB R LE  and least A.D.  (Met)       Start:  01/22/24    Expected End:  02/11/24    Resolved:  02/12/24         Reduce edema R knee equal to left to allow for full function of the R knee.  (Met)       Start:  01/22/24    Expected End:  02/21/24    Resolved:  02/27/24         Pt to be able to ambulate no A.D. FWB R LE with norm al gait pattern  (Met)       Start:  01/22/24    Expected End:  02/21/24    Resolved:  02/22/24         Increase knee flexion to 140 degrees and ext to 0 degrees for gait, stairs, and ADL's.  (Met)       Start:  01/22/24    Expected End:  02/21/24    Resolved:  02/27/24         Pt to increase LE strength to grossly 5/5 for improved gait, stairs, lifting and ADL's.  (Met)       Start:  01/22/24    Expected End:  03/18/24    Resolved:  02/27/24         Pt to score an increase of 10 points or > on LEFS to display overall increased function.   (Met)       Start:  01/22/24    Expected End:  03/18/24    Resolved:  02/27/24         Pt to be independent with a HEP for self management.  (Met)       Start:  01/22/24    Expected End:  03/08/24    Resolved:  02/27/24              R shoudler  Problems       R shoudler  Problems (Resolved)       PT Problem       Reduce pain at worst to 2/10 in the R glut with all prior activity.  (Met)       Start:  03/04/24    Expected End:  03/19/24    Resolved:  03/28/24         Pt to sit with good posture approx 50-75% of the time.  (Met)       Start:  03/04/24    Expected End:  03/19/24    Resolved:  03/28/24         Reduce pain at worst to 0-1/10 in the R shoulder and glut with all prior activity.  (Met)       Start:  03/04/24    Expected End:  04/15/24    Resolved:  03/28/24         Increase shoulder AROM (in degrees) flex to 165, Abd 165, ER 90, IR 65 for lifting, ADL's, reaching and self care.  (Met)       Start:  03/04/24    Expected End:  04/15/24    Resolved:  03/28/24         Pt to have increased shoulder and scapular strength by 1/2 to full MMT grade for improved  ADL's, lifting and postural support.  (Met)       Start:  03/04/24    Expected End:  04/15/24    Resolved:  03/28/24         Pt to decrease quick DASH score by 10-15% to display overall improved shoulder function.  (Met)       Start:  03/04/24    Expected End:  04/15/24    Resolved:  03/28/24         Pt to be independent with a HEP for shoulder for self management.  (Met)       Start:  03/04/24    Expected End:  04/15/24    Resolved:  03/28/24

## 2024-04-29 ENCOUNTER — HOSPITAL ENCOUNTER (OUTPATIENT)
Dept: RADIOLOGY | Facility: CLINIC | Age: 69
Discharge: HOME | End: 2024-04-29
Payer: MEDICARE

## 2024-04-29 DIAGNOSIS — M25.561 PAIN IN RIGHT KNEE: ICD-10-CM

## 2024-04-29 PROCEDURE — 73562 X-RAY EXAM OF KNEE 3: CPT | Mod: RIGHT SIDE | Performed by: RADIOLOGY

## 2024-04-29 PROCEDURE — 73562 X-RAY EXAM OF KNEE 3: CPT | Mod: RT

## 2024-06-10 ENCOUNTER — APPOINTMENT (OUTPATIENT)
Dept: RADIOLOGY | Facility: CLINIC | Age: 69
End: 2024-06-10
Payer: MEDICARE

## 2024-06-10 ENCOUNTER — HOSPITAL ENCOUNTER (OUTPATIENT)
Dept: RADIOLOGY | Facility: CLINIC | Age: 69
Discharge: HOME | End: 2024-06-10
Payer: MEDICARE

## 2024-06-10 VITALS — BODY MASS INDEX: 23.98 KG/M2 | WEIGHT: 127 LBS | HEIGHT: 61 IN

## 2024-06-10 DIAGNOSIS — Z01.419 ENCOUNTER FOR GYNECOLOGICAL EXAMINATION (GENERAL) (ROUTINE) WITHOUT ABNORMAL FINDINGS: ICD-10-CM

## 2024-06-10 DIAGNOSIS — Z12.31 ENCOUNTER FOR SCREENING MAMMOGRAM FOR MALIGNANT NEOPLASM OF BREAST: ICD-10-CM

## 2024-06-10 PROCEDURE — 77063 BREAST TOMOSYNTHESIS BI: CPT | Performed by: RADIOLOGY

## 2024-06-10 PROCEDURE — 77067 SCR MAMMO BI INCL CAD: CPT

## 2024-06-10 PROCEDURE — 77067 SCR MAMMO BI INCL CAD: CPT | Performed by: RADIOLOGY

## 2024-06-11 ENCOUNTER — DOCUMENTATION (OUTPATIENT)
Dept: PRIMARY CARE | Facility: CLINIC | Age: 69
End: 2024-06-11
Payer: MEDICARE

## 2024-06-11 ENCOUNTER — APPOINTMENT (OUTPATIENT)
Dept: RADIOLOGY | Facility: CLINIC | Age: 69
End: 2024-06-11
Payer: MEDICARE

## 2024-06-11 ENCOUNTER — HOSPITAL ENCOUNTER (OUTPATIENT)
Dept: RADIOLOGY | Facility: CLINIC | Age: 69
Discharge: HOME | End: 2024-06-11
Payer: MEDICARE

## 2024-06-11 DIAGNOSIS — Z13.820 ENCOUNTER FOR SCREENING FOR OSTEOPOROSIS: ICD-10-CM

## 2024-06-11 DIAGNOSIS — Z78.0 ASYMPTOMATIC MENOPAUSAL STATE: ICD-10-CM

## 2024-06-11 PROCEDURE — 77080 DXA BONE DENSITY AXIAL: CPT

## 2024-06-14 ENCOUNTER — HOSPITAL ENCOUNTER (OUTPATIENT)
Dept: RADIOLOGY | Facility: EXTERNAL LOCATION | Age: 69
Discharge: HOME | End: 2024-06-14

## 2024-06-14 ENCOUNTER — PATIENT OUTREACH (OUTPATIENT)
Dept: PRIMARY CARE | Facility: CLINIC | Age: 69
End: 2024-06-14
Payer: MEDICARE

## 2024-06-14 ENCOUNTER — TELEPHONE (OUTPATIENT)
Dept: PRIMARY CARE | Facility: CLINIC | Age: 69
End: 2024-06-14
Payer: MEDICARE

## 2024-06-14 DIAGNOSIS — E55.9 VITAMIN D DEFICIENCY: ICD-10-CM

## 2024-06-14 DIAGNOSIS — Z00.00 ROUTINE GENERAL MEDICAL EXAMINATION AT HEALTH CARE FACILITY: Primary | ICD-10-CM

## 2024-06-21 ENCOUNTER — LAB (OUTPATIENT)
Dept: LAB | Facility: CLINIC | Age: 69
End: 2024-06-21
Payer: MEDICARE

## 2024-06-21 DIAGNOSIS — Z00.00 ROUTINE GENERAL MEDICAL EXAMINATION AT HEALTH CARE FACILITY: ICD-10-CM

## 2024-06-21 DIAGNOSIS — E55.9 VITAMIN D DEFICIENCY: ICD-10-CM

## 2024-06-21 LAB
25(OH)D3 SERPL-MCNC: 77 NG/ML (ref 30–100)
ALBUMIN SERPL BCP-MCNC: 4.5 G/DL (ref 3.4–5)
ALP SERPL-CCNC: 89 U/L (ref 33–136)
ALT SERPL W P-5'-P-CCNC: 17 U/L (ref 7–45)
ANION GAP SERPL CALC-SCNC: 15 MMOL/L (ref 10–20)
AST SERPL W P-5'-P-CCNC: 21 U/L (ref 9–39)
BILIRUB SERPL-MCNC: 1.5 MG/DL (ref 0–1.2)
BUN SERPL-MCNC: 16 MG/DL (ref 6–23)
CALCIUM SERPL-MCNC: 9.8 MG/DL (ref 8.6–10.6)
CHLORIDE SERPL-SCNC: 105 MMOL/L (ref 98–107)
CHOLEST SERPL-MCNC: 231 MG/DL (ref 0–199)
CHOLESTEROL/HDL RATIO: 4.1
CO2 SERPL-SCNC: 25 MMOL/L (ref 21–32)
CREAT SERPL-MCNC: 0.78 MG/DL (ref 0.5–1.05)
EGFRCR SERPLBLD CKD-EPI 2021: 83 ML/MIN/1.73M*2
GLUCOSE SERPL-MCNC: 81 MG/DL (ref 74–99)
HDLC SERPL-MCNC: 57 MG/DL
LDLC SERPL CALC-MCNC: 155 MG/DL
NON HDL CHOLESTEROL: 174 MG/DL (ref 0–149)
POTASSIUM SERPL-SCNC: 4.5 MMOL/L (ref 3.5–5.3)
PROT SERPL-MCNC: 6.5 G/DL (ref 6.4–8.2)
SODIUM SERPL-SCNC: 140 MMOL/L (ref 136–145)
TRIGL SERPL-MCNC: 94 MG/DL (ref 0–149)
TSH SERPL-ACNC: 1.49 MIU/L (ref 0.44–3.98)
VLDL: 19 MG/DL (ref 0–40)

## 2024-06-21 PROCEDURE — 82306 VITAMIN D 25 HYDROXY: CPT | Performed by: FAMILY MEDICINE

## 2024-06-21 PROCEDURE — 80061 LIPID PANEL: CPT | Performed by: FAMILY MEDICINE

## 2024-06-21 PROCEDURE — 36415 COLL VENOUS BLD VENIPUNCTURE: CPT

## 2024-06-21 PROCEDURE — 84443 ASSAY THYROID STIM HORMONE: CPT | Performed by: FAMILY MEDICINE

## 2024-06-21 PROCEDURE — 84075 ASSAY ALKALINE PHOSPHATASE: CPT | Performed by: FAMILY MEDICINE

## 2024-06-25 ENCOUNTER — APPOINTMENT (OUTPATIENT)
Dept: PRIMARY CARE | Facility: CLINIC | Age: 69
End: 2024-06-25
Payer: MEDICARE

## 2024-06-25 VITALS
TEMPERATURE: 97.3 F | SYSTOLIC BLOOD PRESSURE: 101 MMHG | BODY MASS INDEX: 24.26 KG/M2 | OXYGEN SATURATION: 96 % | WEIGHT: 128.5 LBS | HEIGHT: 61 IN | DIASTOLIC BLOOD PRESSURE: 61 MMHG | HEART RATE: 58 BPM

## 2024-06-25 DIAGNOSIS — R73.9 ELEVATED BLOOD SUGAR LEVEL: ICD-10-CM

## 2024-06-25 DIAGNOSIS — E03.9 ACQUIRED HYPOTHYROIDISM: ICD-10-CM

## 2024-06-25 DIAGNOSIS — M81.0 AGE-RELATED OSTEOPOROSIS WITHOUT CURRENT PATHOLOGICAL FRACTURE: ICD-10-CM

## 2024-06-25 DIAGNOSIS — E55.9 VITAMIN D DEFICIENCY: ICD-10-CM

## 2024-06-25 DIAGNOSIS — Z13.6 SCREENING FOR CARDIOVASCULAR CONDITION: ICD-10-CM

## 2024-06-25 DIAGNOSIS — K21.9 GASTROESOPHAGEAL REFLUX DISEASE WITHOUT ESOPHAGITIS: ICD-10-CM

## 2024-06-25 DIAGNOSIS — Z00.00 WELLNESS EXAMINATION: Primary | ICD-10-CM

## 2024-06-25 DIAGNOSIS — I25.84 CALCIFICATION OF CORONARY ARTERY: ICD-10-CM

## 2024-06-25 DIAGNOSIS — I25.10 CALCIFICATION OF CORONARY ARTERY: ICD-10-CM

## 2024-06-25 DIAGNOSIS — E78.5 HYPERLIPIDEMIA, UNSPECIFIED HYPERLIPIDEMIA TYPE: ICD-10-CM

## 2024-06-25 DIAGNOSIS — R53.83 OTHER FATIGUE: ICD-10-CM

## 2024-06-25 PROCEDURE — G0439 PPPS, SUBSEQ VISIT: HCPCS | Performed by: FAMILY MEDICINE

## 2024-06-25 PROCEDURE — 1123F ACP DISCUSS/DSCN MKR DOCD: CPT | Performed by: FAMILY MEDICINE

## 2024-06-25 PROCEDURE — 99214 OFFICE O/P EST MOD 30 MIN: CPT | Performed by: FAMILY MEDICINE

## 2024-06-25 PROCEDURE — 1170F FXNL STATUS ASSESSED: CPT | Performed by: FAMILY MEDICINE

## 2024-06-25 PROCEDURE — 1159F MED LIST DOCD IN RCRD: CPT | Performed by: FAMILY MEDICINE

## 2024-06-25 PROCEDURE — 1160F RVW MEDS BY RX/DR IN RCRD: CPT | Performed by: FAMILY MEDICINE

## 2024-06-25 RX ORDER — ERGOCALCIFEROL 1.25 MG/1
1.25 CAPSULE ORAL
COMMUNITY

## 2024-06-25 ASSESSMENT — ACTIVITIES OF DAILY LIVING (ADL)
MANAGING_FINANCES: INDEPENDENT
DRESSING: INDEPENDENT
BATHING: INDEPENDENT
DOING_HOUSEWORK: INDEPENDENT
TAKING_MEDICATION: INDEPENDENT
GROCERY_SHOPPING: INDEPENDENT

## 2024-06-25 ASSESSMENT — PATIENT HEALTH QUESTIONNAIRE - PHQ9
2. FEELING DOWN, DEPRESSED OR HOPELESS: NOT AT ALL
SUM OF ALL RESPONSES TO PHQ9 QUESTIONS 1 AND 2: 0
1. LITTLE INTEREST OR PLEASURE IN DOING THINGS: NOT AT ALL

## 2024-06-25 ASSESSMENT — ENCOUNTER SYMPTOMS
GASTROINTESTINAL NEGATIVE: 1
DIARRHEA: 0
ALLERGIC/IMMUNOLOGIC NEGATIVE: 1
PSYCHIATRIC NEGATIVE: 1
CARDIOVASCULAR NEGATIVE: 1
CHEST TIGHTNESS: 0
SHORTNESS OF BREATH: 0
EYES NEGATIVE: 1
VOMITING: 0
HEMATOLOGIC/LYMPHATIC NEGATIVE: 1
ENDOCRINE NEGATIVE: 1
NEUROLOGICAL NEGATIVE: 1
MUSCULOSKELETAL NEGATIVE: 1
RESPIRATORY NEGATIVE: 1
CONSTITUTIONAL NEGATIVE: 1
NAUSEA: 0

## 2024-06-25 NOTE — PATIENT INSTRUCTIONS
1.  Medicare wellness visit    Today in the office you had your annual Medicare wellness visit    You are up-to-date with your mammogram for breast cancer screening please continue to do so annually    You are up-to-date with your colonoscopy for colon cancer screening    You are up-to-date with your vaccines including shingles vaccine and pneumonia vaccine.    We had a good discussion regards to your recent fracture of your tibia along with the abnormal bone density test indicating osteoporosis.  I would strongly encourage you to talk with either your gynecologist or rheumatologist in regards to treatment options.  I would be more than happy to prescribe Boniva Actonel or Fosamax or you can discuss other treatment options with the specialist.  Equally as important as any of those medicines is continuing with a regular exercise program specifically weightbearing such as walking or lifting weights 5 days a week.  Keeping caffeine to a minimum.    Your thyroid function test is normal please stay on current doses of Synthroid 88 mcg a day    Your recent labs indicate your cholesterol is significantly elevated compared to previous cholesterol levels.  This could be due to inactivity after your fracture.  I am recommending that you continue eating a heart healthy diet such as a Mediterranean diet rich with fresh fruits and vegetables 5-7 servings a day along with lean proteins avoiding simple sugars avoiding fast foods avoiding processed carbohydrates such as breads and cereals I would like you to repeat your cholesterol in 6 months.  Also I would like you to have another CT scan of the coronary arteries your previous CT scan was 2017 showing minimal calcification at that time.  We will discuss those results in more detail once I see    Continue on all other supplements as listed    You have a small bursitis of the left knee I would try to protect that he could wear neoprene sleeve to cut and compress that area it should  slowly improve if it starts to get red warm hot painful this could indicate bacteria has gotten into that space please contact me immediately    If you otherwise stay healthy your repeat labs show improvement I will see you back in 1 year for an annual wellness visit and we will have you repeat lab work prior to that appointment as well

## 2024-06-25 NOTE — PROGRESS NOTES
Subjective   Patient ID: Gely Zamorano is a 68 y.o. female who presents for Medicare Annual Wellness Visit Subsequent (MWV).    HPI     Hyperlipidemia: The patient is presenting today for a follow up of hyperlipidemia. The patient has not been hospitalized for this in the last 6 months. The patient is compliant with medications. Patient denies any side effects to the medications.     GERD  The patient presents today with symptoms of GERD. Pt relates that pain started after a largemeal, but not sure if the pain associated with a meal. No previous hx of abdominal pain. No aggravating factors reported. Pain is typically alleviated by the use of no medications . The patient reports No  change in BM. Pt is only taking none, denies taking any other OTC meds. Patient denies recent trauma.     The patient denies any changes in vision, hearing or dental.     The patient maintains they do not have any chest pain, chest tightness or shortness of breath.    They do not experience nausea, emesis, changes in bowel movements or dyspepsia.    The patient's colonoscopy is up to date.    The patient's mammogram is up to date.    The patient's vaccinations are up to date.    Review of Systems   Constitutional: Negative.    HENT: Negative.  Negative for dental problem and hearing loss.    Eyes: Negative.  Negative for visual disturbance.   Respiratory: Negative.  Negative for chest tightness and shortness of breath.    Cardiovascular: Negative.  Negative for chest pain.   Gastrointestinal: Negative.  Negative for diarrhea, nausea and vomiting.   Endocrine: Negative.    Genitourinary: Negative.    Musculoskeletal: Negative.    Skin: Negative.    Allergic/Immunologic: Negative.    Neurological: Negative.    Hematological: Negative.    Psychiatric/Behavioral: Negative.         Objective   /61 (BP Location: Left arm, Patient Position: Sitting, BP Cuff Size: Adult)   Pulse 58   Temp 36.3 °C (97.3 °F) (Temporal)   Ht 1.549 m  "(5' 1\")   Wt 58.3 kg (128 lb 8 oz)   LMP  (LMP Unknown)   SpO2 96%   BMI 24.28 kg/m²     Physical Exam  Constitutional:       Appearance: Normal appearance.   HENT:      Head: Normocephalic and atraumatic.      Nose: Nose normal.   Eyes:      Extraocular Movements: Extraocular movements intact.      Conjunctiva/sclera: Conjunctivae normal.      Pupils: Pupils are equal, round, and reactive to light.   Cardiovascular:      Rate and Rhythm: Normal rate and regular rhythm.      Pulses: Normal pulses.      Heart sounds: Normal heart sounds.   Pulmonary:      Effort: Pulmonary effort is normal.      Breath sounds: Normal breath sounds.   Abdominal:      General: Bowel sounds are normal.      Palpations: Abdomen is soft.   Genitourinary:     General: Normal vulva.      Rectum: Normal.   Musculoskeletal:         General: Normal range of motion.      Cervical back: Normal range of motion and neck supple.      Left knee: Swelling present.      Comments: Bursitis of left knee.   Skin:     General: Skin is warm.   Neurological:      Mental Status: She is alert and oriented to person, place, and time.   Psychiatric:         Mood and Affect: Mood normal.         Behavior: Behavior normal.         Thought Content: Thought content normal.         Judgment: Judgment normal.         Assessment/Plan   Problem List Items Addressed This Visit             ICD-10-CM    Calcification of coronary artery I25.10, I25.84    Gastroesophageal reflux disease K21.9    Hyperlipidemia E78.5    Relevant Orders    Lipid Panel    Comprehensive Metabolic Panel    CBC and Auto Differential    Hypothyroidism E03.9    Vitamin D deficiency E55.9    Relevant Orders    Vitamin D 25-Hydroxy,Total (for eval of Vitamin D levels)    Wellness examination - Primary Z00.00    Relevant Orders    Follow Up In [facility] - PCP - Medicare Annual    Age-related osteoporosis without current pathological fracture M81.0     Other Visit Diagnoses         " Codes    Elevated blood sugar level     R73.9    Other fatigue     R53.83    Relevant Orders    TSH with reflex to Free T4 if abnormal    Screening for cardiovascular condition     Z13.6    Relevant Orders    CT cardiac scoring wo IV contrast               1. Wellness examination  Follow Up In [facility] - PCP    Follow Up In [facility] - PCP - Medicare Annual      2. Vitamin D deficiency  Vitamin D 25-Hydroxy,Total (for eval of Vitamin D levels)      3. Elevated blood sugar level  CANCELED: Hemoglobin A1C      4. Other fatigue  TSH with reflex to Free T4 if abnormal      5. Hyperlipidemia, unspecified hyperlipidemia type  Lipid Panel    Comprehensive Metabolic Panel    CBC and Auto Differential      6. Screening for cardiovascular condition  CT cardiac scoring wo IV contrast      7. Calcification of coronary artery        8. Acquired hypothyroidism        9. Gastroesophageal reflux disease without esophagitis        10. Age-related osteoporosis without current pathological fracture          1.  Medicare wellness visit    Today in the office you had your annual Medicare wellness visit    You are up-to-date with your mammogram for breast cancer screening please continue to do so annually    You are up-to-date with your colonoscopy for colon cancer screening    You are up-to-date with your vaccines including shingles vaccine and pneumonia vaccine.    We had a good discussion regards to your recent fracture of your tibia along with the abnormal bone density test indicating osteoporosis.  I would strongly encourage you to talk with either your gynecologist or rheumatologist in regards to treatment options.  I would be more than happy to prescribe Boniva Actonel or Fosamax or you can discuss other treatment options with the specialist.  Equally as important as any of those medicines is continuing with a regular exercise program specifically weightbearing such as walking or lifting weights 5 days a  week.  Keeping caffeine to a minimum.    Your thyroid function test is normal please stay on current doses of Synthroid 88 mcg a day    Your recent labs indicate your cholesterol is significantly elevated compared to previous cholesterol levels.  This could be due to inactivity after your fracture.  I am recommending that you continue eating a heart healthy diet such as a Mediterranean diet rich with fresh fruits and vegetables 5-7 servings a day along with lean proteins avoiding simple sugars avoiding fast foods avoiding processed carbohydrates such as breads and cereals I would like you to repeat your cholesterol in 6 months.  Also I would like you to have another CT scan of the coronary arteries your previous CT scan was 2017 showing minimal calcification at that time.  We will discuss those results in more detail once I see    Continue on all other supplements as listed    You have a small bursitis of the left knee I would try to protect that he could wear neoprene sleeve to cut and compress that area it should slowly improve if it starts to get red warm hot painful this could indicate bacteria has gotten into that space please contact me immediately    If you otherwise stay healthy your repeat labs show improvement I will see you back in 1 year for an annual wellness visit and we will have you repeat lab work prior to that appointment as well    Follow-up in 6 months or sooner if there are any concerns.    Scribe Attestation  By signing my name below, ICorrine, Elena   attest that this documentation has been prepared under the direction and in the presence of Param Estes MD.    This note has been transcribed using a medical scribe and there is a possibility of unintentional typing misprints.

## 2024-06-26 PROBLEM — M81.0 AGE-RELATED OSTEOPOROSIS WITHOUT CURRENT PATHOLOGICAL FRACTURE: Status: ACTIVE | Noted: 2024-06-26

## 2024-07-01 ENCOUNTER — PATIENT OUTREACH (OUTPATIENT)
Dept: PRIMARY CARE | Facility: CLINIC | Age: 69
End: 2024-07-01
Payer: MEDICARE

## 2024-07-09 ENCOUNTER — DOCUMENTATION (OUTPATIENT)
Dept: PRIMARY CARE | Facility: CLINIC | Age: 69
End: 2024-07-09
Payer: MEDICARE

## 2024-08-06 ENCOUNTER — PATIENT OUTREACH (OUTPATIENT)
Dept: PRIMARY CARE | Facility: CLINIC | Age: 69
End: 2024-08-06
Payer: MEDICARE

## 2024-08-06 VITALS — WEIGHT: 128 LBS | BODY MASS INDEX: 24.17 KG/M2 | HEIGHT: 61 IN

## 2024-08-06 DIAGNOSIS — K21.9 GASTROESOPHAGEAL REFLUX DISEASE WITHOUT ESOPHAGITIS: ICD-10-CM

## 2024-08-06 DIAGNOSIS — E78.5 HYPERLIPIDEMIA, UNSPECIFIED HYPERLIPIDEMIA TYPE: ICD-10-CM

## 2024-08-06 DIAGNOSIS — E03.9 ACQUIRED HYPOTHYROIDISM: ICD-10-CM

## 2024-08-06 SDOH — ECONOMIC STABILITY: GENERAL: WOULD YOU LIKE HELP WITH ANY OF THE FOLLOWING NEEDS?: I DONT NEED HELP WITH ANY OF THESE

## 2024-08-06 SDOH — ECONOMIC STABILITY: FOOD INSECURITY
ARE ANY OF YOUR NEEDS URGENT? FOR EXAMPLE, UNCERTAINTY OF WHERE YOU WILL GET YOUR NEXT MEAL OR NOT HAVING THE MEDICATIONS YOU NEED TO TAKE TOMORROW.: NO

## 2024-08-06 ASSESSMENT — ACTIVITIES OF DAILY LIVING (ADL): BATHING: YES

## 2024-08-06 NOTE — PROGRESS NOTES
Would like call back on her phone [phone].  Introduced patient to Chronic Care  Management Program.  Explained that in my role as Care Manager I will:     -Be a point of contact with questions and concerns     -Focus on chronic conditions and achieving health goals    - Make sure patient is receiving all preventative care he/se is due for  Verbal consent received to enroll.  My contact info was provided for any needs that may arise.     Patient introduced to Chronic Care Management Services  Patient opted into services on: 8/06/2024  Services will be performed under the direction of PCP: Franklyn  Patient has planned AWV/Follow up on: 10/02/24  cards  POC will be derived from provider's comprehensive assessment and outlined plan of care found in AWV or other follow up.    -

## 2024-09-06 ENCOUNTER — PATIENT OUTREACH (OUTPATIENT)
Dept: PRIMARY CARE | Facility: CLINIC | Age: 69
End: 2024-09-06
Payer: MEDICARE

## 2024-09-09 ENCOUNTER — PATIENT OUTREACH (OUTPATIENT)
Dept: PRIMARY CARE | Facility: CLINIC | Age: 69
End: 2024-09-09
Payer: MEDICARE

## 2024-09-17 ENCOUNTER — PATIENT OUTREACH (OUTPATIENT)
Dept: PRIMARY CARE | Facility: CLINIC | Age: 69
End: 2024-09-17
Payer: MEDICARE

## 2024-09-18 ENCOUNTER — PATIENT OUTREACH (OUTPATIENT)
Dept: PRIMARY CARE | Facility: CLINIC | Age: 69
End: 2024-09-18
Payer: MEDICARE

## 2024-09-19 ENCOUNTER — PATIENT OUTREACH (OUTPATIENT)
Dept: PRIMARY CARE | Facility: CLINIC | Age: 69
End: 2024-09-19
Payer: MEDICARE

## 2024-09-19 DIAGNOSIS — K21.9 GASTROESOPHAGEAL REFLUX DISEASE WITHOUT ESOPHAGITIS: ICD-10-CM

## 2024-09-19 DIAGNOSIS — E78.5 HYPERLIPIDEMIA, UNSPECIFIED HYPERLIPIDEMIA TYPE: ICD-10-CM

## 2024-09-19 NOTE — PROGRESS NOTES
Spoke with Gertrudis today. She had blown out some green mucus. She thought he had been having a sore tooth. The is now having clear mucus and headache/toothache has resolved. Discussed the uses of steam and  warm beverages to make sure she is free of  any further issues with her sinus.    She also had an eye issue and was started on eye drops as a prevention.  She is not having any issues with getting or taking medication.  She is feeling some discomfort with the temperature changes.    No further issues or concerns at this time. She will reach out if CM is needed.

## 2024-10-02 ENCOUNTER — HOSPITAL ENCOUNTER (OUTPATIENT)
Dept: RADIOLOGY | Facility: CLINIC | Age: 69
Discharge: HOME | End: 2024-10-02
Payer: MEDICARE

## 2024-10-02 DIAGNOSIS — Z13.6 SCREENING FOR CARDIOVASCULAR CONDITION: ICD-10-CM

## 2024-10-02 PROCEDURE — 75571 CT HRT W/O DYE W/CA TEST: CPT

## 2024-10-08 DIAGNOSIS — R93.1 ELEVATED CORONARY ARTERY CALCIUM SCORE: Primary | ICD-10-CM

## 2024-10-21 ENCOUNTER — PATIENT OUTREACH (OUTPATIENT)
Dept: PRIMARY CARE | Facility: CLINIC | Age: 69
End: 2024-10-21
Payer: MEDICARE

## 2024-10-22 ENCOUNTER — PATIENT OUTREACH (OUTPATIENT)
Dept: PRIMARY CARE | Facility: CLINIC | Age: 69
End: 2024-10-22
Payer: MEDICARE

## 2024-10-22 DIAGNOSIS — E78.5 HYPERLIPIDEMIA, UNSPECIFIED HYPERLIPIDEMIA TYPE: ICD-10-CM

## 2024-10-22 DIAGNOSIS — K21.9 GASTROESOPHAGEAL REFLUX DISEASE WITHOUT ESOPHAGITIS: ICD-10-CM

## 2024-10-22 NOTE — PROGRESS NOTES
Now doing cross fit for exercise with her spouse. Trying to improve her cholesterol/ratio.  She assisted her spouse driving to the [place] with [place] to support the farmers and livestock.  They had multiple residents assist.    She is making heathy meals to assist her and her spouse. She is having no issues obtaining or taking her medications.  Still has some issues with her leg but they are tolerable.

## 2024-10-28 DIAGNOSIS — E03.9 ACQUIRED HYPOTHYROIDISM: ICD-10-CM

## 2024-10-28 RX ORDER — LEVOTHYROXINE SODIUM 88 UG/1
88 TABLET ORAL DAILY
Qty: 30 TABLET | Refills: 11 | Status: SHIPPED | OUTPATIENT
Start: 2024-10-28 | End: 2025-10-28

## 2024-11-21 ENCOUNTER — PATIENT OUTREACH (OUTPATIENT)
Dept: PRIMARY CARE | Facility: CLINIC | Age: 69
End: 2024-11-21
Payer: MEDICARE

## 2024-11-21 DIAGNOSIS — K21.9 GASTROESOPHAGEAL REFLUX DISEASE WITHOUT ESOPHAGITIS: ICD-10-CM

## 2024-11-21 DIAGNOSIS — E78.5 HYPERLIPIDEMIA, UNSPECIFIED HYPERLIPIDEMIA TYPE: ICD-10-CM

## 2024-11-21 NOTE — PROGRESS NOTES
Spoke with Gertrudis. She is still exercising with doing cross fit . She feels she is much better/stronger than previously.  She continues to cook healthy meals and even her dogs are eating broccoli.  Her leg has improved.  Discussed GERD and stomach acid. Last time occurred when she had not eaten and had an empty stomach and then ate a late lunch.  Reviewed keeping something in her stomach.    She is having some sinus issues. They feel plugged but she is able to breathe well. Discussed allergies and she might try OTC Flonase.  She is having no issues taking or obtaining medications. Encouraged her to reach out with any needs  or concerns.

## 2024-12-23 ENCOUNTER — PATIENT OUTREACH (OUTPATIENT)
Dept: PRIMARY CARE | Facility: CLINIC | Age: 69
End: 2024-12-23
Payer: MEDICARE

## 2024-12-23 DIAGNOSIS — E78.5 HYPERLIPIDEMIA, UNSPECIFIED HYPERLIPIDEMIA TYPE: ICD-10-CM

## 2024-12-23 DIAGNOSIS — K21.9 GASTROESOPHAGEAL REFLUX DISEASE WITHOUT ESOPHAGITIS: ICD-10-CM

## 2025-01-15 DIAGNOSIS — E03.9 ACQUIRED HYPOTHYROIDISM: ICD-10-CM

## 2025-01-15 RX ORDER — LEVOTHYROXINE SODIUM 88 UG/1
88 TABLET ORAL DAILY
Qty: 90 TABLET | Refills: 3 | Status: SHIPPED | OUTPATIENT
Start: 2025-01-15 | End: 2026-01-15

## 2025-01-23 ENCOUNTER — PATIENT OUTREACH (OUTPATIENT)
Dept: PRIMARY CARE | Facility: CLINIC | Age: 70
End: 2025-01-23
Payer: MEDICARE

## 2025-01-24 ENCOUNTER — PATIENT OUTREACH (OUTPATIENT)
Dept: PRIMARY CARE | Facility: CLINIC | Age: 70
End: 2025-01-24
Payer: MEDICARE

## 2025-01-28 ENCOUNTER — PATIENT OUTREACH (OUTPATIENT)
Dept: PRIMARY CARE | Facility: CLINIC | Age: 70
End: 2025-01-28
Payer: MEDICARE

## 2025-01-29 ENCOUNTER — PATIENT OUTREACH (OUTPATIENT)
Dept: PRIMARY CARE | Facility: CLINIC | Age: 70
End: 2025-01-29
Payer: MEDICARE

## 2025-01-29 DIAGNOSIS — K21.9 GASTROESOPHAGEAL REFLUX DISEASE WITHOUT ESOPHAGITIS: ICD-10-CM

## 2025-01-29 DIAGNOSIS — E78.5 HYPERLIPIDEMIA, UNSPECIFIED HYPERLIPIDEMIA TYPE: ICD-10-CM

## 2025-01-29 NOTE — PROGRESS NOTES
Spoke to Gertrudis this morning. She continues to do cross fit at least 3 x a week. Her neighbor goes with her so she has support. She is gaining muscle mass. She is making good gains.  She continues to cook and eat healthy meals. Even her pets continue to eat healthy.  Her sinuitis has resolved . She is feeling well.  She has not had any issues with GERD.  She has not had any issues obtaining her medications.  Encouraged her to reach out if she had any concerns.

## 2025-02-10 DIAGNOSIS — E03.9 ACQUIRED HYPOTHYROIDISM: ICD-10-CM

## 2025-02-10 RX ORDER — LEVOTHYROXINE SODIUM 88 UG/1
88 TABLET ORAL DAILY
Qty: 90 TABLET | Refills: 3 | Status: SHIPPED | OUTPATIENT
Start: 2025-02-10 | End: 2026-02-10

## 2025-02-28 ENCOUNTER — PATIENT OUTREACH (OUTPATIENT)
Dept: PRIMARY CARE | Facility: CLINIC | Age: 70
End: 2025-02-28
Payer: MEDICARE

## 2025-02-28 DIAGNOSIS — K21.9 GASTROESOPHAGEAL REFLUX DISEASE WITHOUT ESOPHAGITIS: ICD-10-CM

## 2025-02-28 DIAGNOSIS — E78.5 HYPERLIPIDEMIA, UNSPECIFIED HYPERLIPIDEMIA TYPE: ICD-10-CM

## 2025-02-28 DIAGNOSIS — E03.9 ACQUIRED HYPOTHYROIDISM: ICD-10-CM

## 2025-02-28 PROCEDURE — 99490 CHRNC CARE MGMT STAFF 1ST 20: CPT | Performed by: FAMILY MEDICINE

## 2025-02-28 NOTE — PROGRESS NOTES
She is Rving with her spouse and 2 dogs.  They are able to control their diet and keep healthy.  He has been hiking instead of her cross-fit.  She continues to take her medications and has no issues obtaining them. Fausto jokes she is better taking her medications than he is .  No issues with GERD as they can control their food intake. The have a full size frig with them.  Encouraged them to reach out if they need any thing.

## 2025-03-12 ENCOUNTER — PATIENT OUTREACH (OUTPATIENT)
Dept: PRIMARY CARE | Facility: CLINIC | Age: 70
End: 2025-03-12
Payer: MEDICARE

## 2025-03-17 ENCOUNTER — PATIENT OUTREACH (OUTPATIENT)
Dept: PRIMARY CARE | Facility: CLINIC | Age: 70
End: 2025-03-17
Payer: MEDICARE

## 2025-03-24 ENCOUNTER — PATIENT OUTREACH (OUTPATIENT)
Dept: PRIMARY CARE | Facility: CLINIC | Age: 70
End: 2025-03-24
Payer: MEDICARE

## 2025-03-24 DIAGNOSIS — K21.9 GASTROESOPHAGEAL REFLUX DISEASE WITHOUT ESOPHAGITIS: ICD-10-CM

## 2025-03-24 DIAGNOSIS — E03.9 ACQUIRED HYPOTHYROIDISM: ICD-10-CM

## 2025-03-24 DIAGNOSIS — E78.5 HYPERLIPIDEMIA, UNSPECIFIED HYPERLIPIDEMIA TYPE: ICD-10-CM

## 2025-03-24 NOTE — PROGRESS NOTES
Gertrudis has been doing well. Exercising to stay active.  She is not having any GERD or digestive issues.  She has been able to take her medications and obtain them. She cooks a healthy diet for her and her spouse.  Discussed that her spouse is ill.  Encouraged him to reach out with any more concerns.

## 2025-04-17 ENCOUNTER — PATIENT OUTREACH (OUTPATIENT)
Dept: PRIMARY CARE | Facility: CLINIC | Age: 70
End: 2025-04-17
Payer: MEDICARE

## 2025-04-22 ENCOUNTER — PATIENT OUTREACH (OUTPATIENT)
Dept: PRIMARY CARE | Facility: CLINIC | Age: 70
End: 2025-04-22
Payer: MEDICARE

## 2025-04-23 ENCOUNTER — PATIENT OUTREACH (OUTPATIENT)
Dept: PRIMARY CARE | Facility: CLINIC | Age: 70
End: 2025-04-23
Payer: MEDICARE

## 2025-04-24 ENCOUNTER — PATIENT OUTREACH (OUTPATIENT)
Dept: PRIMARY CARE | Facility: CLINIC | Age: 70
End: 2025-04-24
Payer: MEDICARE

## 2025-04-29 ENCOUNTER — PATIENT OUTREACH (OUTPATIENT)
Dept: PRIMARY CARE | Facility: CLINIC | Age: 70
End: 2025-04-29
Payer: MEDICARE

## 2025-04-29 DIAGNOSIS — E78.5 HYPERLIPIDEMIA, UNSPECIFIED HYPERLIPIDEMIA TYPE: ICD-10-CM

## 2025-04-29 DIAGNOSIS — K21.9 GASTROESOPHAGEAL REFLUX DISEASE WITHOUT ESOPHAGITIS: ICD-10-CM

## 2025-04-29 DIAGNOSIS — E03.9 ACQUIRED HYPOTHYROIDISM: ICD-10-CM

## 2025-04-29 PROCEDURE — 99490 CHRNC CARE MGMT STAFF 1ST 20: CPT | Performed by: FAMILY MEDICINE

## 2025-04-29 NOTE — PROGRESS NOTES
Gertrudis called  back. She continues to eat healthy and exercise. She does Cross fit. She is now starting to garden again. The weather is just getting warm enough to prepare the soil for gardening.  She is able to take and obtain her medication.  Gertrudis pretty much has her GERD under control with her timing of eating foods and what to eat.  She is feeling well and her aches and pains are tolerable. Support given. Encouraged her to reach out with any needs or concerns.

## 2025-05-28 ENCOUNTER — PATIENT OUTREACH (OUTPATIENT)
Dept: PRIMARY CARE | Facility: CLINIC | Age: 70
End: 2025-05-28
Payer: MEDICARE

## 2025-05-29 ENCOUNTER — PATIENT OUTREACH (OUTPATIENT)
Dept: PRIMARY CARE | Facility: CLINIC | Age: 70
End: 2025-05-29
Payer: MEDICARE

## 2025-05-30 ENCOUNTER — PATIENT OUTREACH (OUTPATIENT)
Dept: PRIMARY CARE | Facility: CLINIC | Age: 70
End: 2025-05-30
Payer: MEDICARE

## 2025-05-30 DIAGNOSIS — K21.9 GASTROESOPHAGEAL REFLUX DISEASE WITHOUT ESOPHAGITIS: ICD-10-CM

## 2025-05-30 DIAGNOSIS — R93.1 ELEVATED CORONARY ARTERY CALCIUM SCORE: ICD-10-CM

## 2025-05-30 DIAGNOSIS — E78.5 HYPERLIPIDEMIA, UNSPECIFIED HYPERLIPIDEMIA TYPE: ICD-10-CM

## 2025-05-30 NOTE — PROGRESS NOTES
Care Management Monthly Outreach  Chart review completed  Confirmation of at least 2 patient identifiers  Change in insurance? No    Has patient been to ER/Urgent Care since last outreach? No    Last Office Visit with PCP: 6/25/2024   Next Office Visit with PCP: 7/1/2025   APC Collaboration: n/a    Chronic Conditions and Outreach Summary:   Gastroesophageal reflux disease without esophagitis    Hyperlipidemia, unspecified hyperlipidemia type    Elevated coronary artery calcium score    Continues to do crossfit 3 x a week. Has really noticed that it has helped with her activity endurance.  She continues to get a healthy dirt. She has not had any issues with GERD. She has no issues taking or obtaining jher medications.    Medications:   Are there medication changes since last visit? No  Refills needed? No    Social Drivers of Health: Deferred  Care Gaps Addressed? Deferred  Care Plan addressed: No    Upcoming Appointments:   Future Appointments       Date / Time Provider Department Dept Phone    7/1/2025 7:30 AM (Arrive by 7:15 AM) Param Estes MD [facility] [phone]          Blood Pressures Reviewed  BP Readings from Last 3 Encounters:   06/25/24 101/61   11/20/23 134/79   11/15/23 125/78     Labs Reviewed:  Lab Results   Component Value Date    CREATININE 0.78 06/21/2024    GLUCOSE 81 06/21/2024    ALKPHOS 89 06/21/2024    K 4.5 06/21/2024    PROT 6.5 06/21/2024     06/21/2024    CALCIUM 9.8 06/21/2024    AST 21 06/21/2024    ALT 17 06/21/2024    BUN 16 06/21/2024    GFRF >90 07/18/2023     Lab Results   Component Value Date    TRIG 94 06/21/2024    CHOL 231 (H) 06/21/2024    LDLCALC 155 (H) 06/21/2024    HDL 57.0 06/21/2024     Lab Results   Component Value Date    HGBA1C 5.2 06/20/2023    HGBA1C 5.2 06/14/2021    HGBA1C 5.4 06/10/2020     Lab Results   Component Value Date    WBC 6.9 06/20/2023    RBC 4.25 06/20/2023    HGB 13.3 06/20/2023     06/20/2023   No other concerns at  this time.  Agreeable to continue monthly outreaches.  Encouraged to call if questions or concerns arise.    MARIA FERNANDA CASILLAS

## 2025-06-18 ENCOUNTER — TELEPHONE (OUTPATIENT)
Dept: PRIMARY CARE | Facility: CLINIC | Age: 70
End: 2025-06-18
Payer: MEDICARE

## 2025-06-18 NOTE — TELEPHONE ENCOUNTER
pt called in requesting her lab orders get placed prior to her upcoming appointment and also wanted to know if you could place a order for ABOP ?

## 2025-06-19 DIAGNOSIS — E78.5 HYPERLIPIDEMIA, UNSPECIFIED HYPERLIPIDEMIA TYPE: ICD-10-CM

## 2025-06-19 DIAGNOSIS — E55.9 VITAMIN D DEFICIENCY: ICD-10-CM

## 2025-06-19 DIAGNOSIS — R53.83 OTHER FATIGUE: ICD-10-CM

## 2025-06-19 DIAGNOSIS — I25.10 CALCIFICATION OF CORONARY ARTERY: Primary | ICD-10-CM

## 2025-06-25 LAB
25(OH)D3+25(OH)D2 SERPL-MCNC: 90 NG/ML (ref 30–100)
ALBUMIN SERPL-MCNC: 4.3 G/DL (ref 3.6–5.1)
ALP SERPL-CCNC: 81 U/L (ref 37–153)
ALT SERPL-CCNC: 16 U/L (ref 6–29)
ANION GAP SERPL CALCULATED.4IONS-SCNC: 8 MMOL/L (CALC) (ref 7–17)
AST SERPL-CCNC: 20 U/L (ref 10–35)
BASOPHILS # BLD AUTO: 63 CELLS/UL (ref 0–200)
BASOPHILS NFR BLD AUTO: 1 %
BILIRUB SERPL-MCNC: 1.5 MG/DL (ref 0.2–1.2)
BUN SERPL-MCNC: 18 MG/DL (ref 7–25)
CALCIUM SERPL-MCNC: 9.2 MG/DL (ref 8.6–10.4)
CHLORIDE SERPL-SCNC: 109 MMOL/L (ref 98–110)
CHOLEST SERPL-MCNC: 218 MG/DL
CHOLEST/HDLC SERPL: 3.7 (CALC)
CO2 SERPL-SCNC: 25 MMOL/L (ref 20–32)
CREAT SERPL-MCNC: 0.83 MG/DL (ref 0.5–1.05)
EGFRCR SERPLBLD CKD-EPI 2021: 77 ML/MIN/1.73M2
EOSINOPHIL # BLD AUTO: 378 CELLS/UL (ref 15–500)
EOSINOPHIL NFR BLD AUTO: 6 %
ERYTHROCYTE [DISTWIDTH] IN BLOOD BY AUTOMATED COUNT: 12.5 % (ref 11–15)
GLUCOSE SERPL-MCNC: 82 MG/DL (ref 65–99)
HCT VFR BLD AUTO: 41.7 % (ref 35–45)
HDLC SERPL-MCNC: 59 MG/DL
HGB BLD-MCNC: 13.7 G/DL (ref 11.7–15.5)
LDLC SERPL CALC-MCNC: 141 MG/DL (CALC)
LYMPHOCYTES # BLD AUTO: 2155 CELLS/UL (ref 850–3900)
LYMPHOCYTES NFR BLD AUTO: 34.2 %
MCH RBC QN AUTO: 31.1 PG (ref 27–33)
MCHC RBC AUTO-ENTMCNC: 32.9 G/DL (ref 32–36)
MCV RBC AUTO: 94.6 FL (ref 80–100)
MONOCYTES # BLD AUTO: 523 CELLS/UL (ref 200–950)
MONOCYTES NFR BLD AUTO: 8.3 %
NEUTROPHILS # BLD AUTO: 3182 CELLS/UL (ref 1500–7800)
NEUTROPHILS NFR BLD AUTO: 50.5 %
NONHDLC SERPL-MCNC: 159 MG/DL (CALC)
PLATELET # BLD AUTO: 234 THOUSAND/UL (ref 140–400)
PMV BLD REES-ECKER: 9.8 FL (ref 7.5–12.5)
POTASSIUM SERPL-SCNC: 4 MMOL/L (ref 3.5–5.3)
PROT SERPL-MCNC: 6 G/DL (ref 6.1–8.1)
RBC # BLD AUTO: 4.41 MILLION/UL (ref 3.8–5.1)
SODIUM SERPL-SCNC: 142 MMOL/L (ref 135–146)
TRIGL SERPL-MCNC: 79 MG/DL
TSH SERPL-ACNC: 1.72 MIU/L (ref 0.4–4.5)
WBC # BLD AUTO: 6.3 THOUSAND/UL (ref 3.8–10.8)

## 2025-06-27 ENCOUNTER — PATIENT OUTREACH (OUTPATIENT)
Dept: PRIMARY CARE | Facility: CLINIC | Age: 70
End: 2025-06-27
Payer: MEDICARE

## 2025-06-30 ENCOUNTER — PATIENT OUTREACH (OUTPATIENT)
Dept: PRIMARY CARE | Facility: CLINIC | Age: 70
End: 2025-06-30
Payer: MEDICARE

## 2025-07-01 ENCOUNTER — APPOINTMENT (OUTPATIENT)
Dept: PRIMARY CARE | Facility: CLINIC | Age: 70
End: 2025-07-01
Payer: MEDICARE

## 2025-07-01 VITALS
HEIGHT: 61 IN | WEIGHT: 130.5 LBS | OXYGEN SATURATION: 97 % | DIASTOLIC BLOOD PRESSURE: 63 MMHG | HEART RATE: 63 BPM | SYSTOLIC BLOOD PRESSURE: 102 MMHG | TEMPERATURE: 97 F | BODY MASS INDEX: 24.64 KG/M2

## 2025-07-01 DIAGNOSIS — Z12.11 COLON CANCER SCREENING: ICD-10-CM

## 2025-07-01 DIAGNOSIS — E03.9 HYPOTHYROIDISM, UNSPECIFIED TYPE: ICD-10-CM

## 2025-07-01 DIAGNOSIS — E55.9 VITAMIN D DEFICIENCY: ICD-10-CM

## 2025-07-01 DIAGNOSIS — E78.2 MIXED HYPERLIPIDEMIA: ICD-10-CM

## 2025-07-01 DIAGNOSIS — Z00.00 WELLNESS EXAMINATION: Primary | ICD-10-CM

## 2025-07-01 DIAGNOSIS — S41.112A LACERATION OF LEFT UPPER EXTREMITY, INITIAL ENCOUNTER: ICD-10-CM

## 2025-07-01 DIAGNOSIS — Z12.31 SCREENING MAMMOGRAM, ENCOUNTER FOR: ICD-10-CM

## 2025-07-01 PROCEDURE — 1170F FXNL STATUS ASSESSED: CPT | Performed by: FAMILY MEDICINE

## 2025-07-01 PROCEDURE — 1160F RVW MEDS BY RX/DR IN RCRD: CPT | Performed by: FAMILY MEDICINE

## 2025-07-01 PROCEDURE — 99213 OFFICE O/P EST LOW 20 MIN: CPT | Performed by: FAMILY MEDICINE

## 2025-07-01 PROCEDURE — G0439 PPPS, SUBSEQ VISIT: HCPCS | Performed by: FAMILY MEDICINE

## 2025-07-01 PROCEDURE — 1123F ACP DISCUSS/DSCN MKR DOCD: CPT | Performed by: FAMILY MEDICINE

## 2025-07-01 PROCEDURE — 1159F MED LIST DOCD IN RCRD: CPT | Performed by: FAMILY MEDICINE

## 2025-07-01 PROCEDURE — 90714 TD VACC NO PRESV 7 YRS+ IM: CPT | Performed by: FAMILY MEDICINE

## 2025-07-01 PROCEDURE — 1036F TOBACCO NON-USER: CPT | Performed by: FAMILY MEDICINE

## 2025-07-01 PROCEDURE — 3008F BODY MASS INDEX DOCD: CPT | Performed by: FAMILY MEDICINE

## 2025-07-01 PROCEDURE — 90471 IMMUNIZATION ADMIN: CPT | Performed by: FAMILY MEDICINE

## 2025-07-01 ASSESSMENT — ACTIVITIES OF DAILY LIVING (ADL)
DOING_HOUSEWORK: INDEPENDENT
TAKING_MEDICATION: INDEPENDENT
DRESSING: INDEPENDENT
MANAGING_FINANCES: INDEPENDENT
GROCERY_SHOPPING: INDEPENDENT
BATHING: INDEPENDENT

## 2025-07-01 NOTE — PATIENT INSTRUCTIONS
VISIT SUMMARY:  During your visit, we discussed your right shoulder pain, reviewed your general health maintenance, and talked about your spouse's atrial fibrillation. We also reviewed your recent lab results and vaccinations.    YOUR PLAN:  -RIGHT SHOULDER PAIN: You have mild to moderate arthritis in your right shoulder's AC joint, which is causing pain and limiting your activities. Arthritis is a condition where the joints become inflamed, leading to pain and stiffness. We discussed resuming your physical therapy exercises and considering a referral to an orthopedic specialist for further evaluation and a possible cortisone injection. Additionally, keep weights close to your body during exercises to avoid aggravating your shoulder.    -ATRIAL FIBRILLATION (AFIB) RISK DISCUSSION FOR SPOUSE:     -GENERAL HEALTH MAINTENANCE: Your recent lab results are normal, and you are up to date with your vaccinations, including shingles and pneumonia. We discussed the potential for an RSV vaccine, and I recommend continuing your healthy diet and exercise routine. We will administer a tetanus vaccine due to the abrasion on your left forearm and provide an order for a mammogram.    INSTRUCTIONS:  1. Resume your shoulder physical therapy exercises.  2. Consider seeing an orthopedic specialist for further evaluation and a possible cortisone injection for your shoulder.  3. Keep weights close to your body during exercises to avoid shoulder pain.    5. Administer a tetanus vaccine due to the abrasion/laceration on your left forearm.  6. Continue your healthy eating and exercise routine.  7. Follow up with your scheduled colonoscopy on July 22 and ensure you get your mammogram as ordered.

## 2025-07-01 NOTE — PROGRESS NOTES
Subjective   Patient ID: Gely Zamorano is a 69 y.o. female who presents for Medicare Annual Wellness Visit Subsequent (MWV).  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    She experiences right shoulder pain that limits activities such as performing push-ups, reaching behind her, and wiping. The pain sometimes disrupts her sleep. She previously underwent physical therapy, which provided some relief, but the pain has worsened. An x-ray was taken, and she was informed it could be a muscle or tendon issue. She has not been consistent with her exercises.    She maintains an active lifestyle, participating in moderate CrossFit twice a week and walking three to four miles on other days. No chest pain, pressure, or tightness during exercise. She is a nonsmoker and does not use inhalers.    She has a small abrasion on her left forearm caused by her dog, which is healing without pain. She is cautious with Band-Aid use due to potential skin irritation.    Her mood is positive, and she maintains a healthy lifestyle, including a diet rich in fruits and vegetables. She has had both shingles vaccines, a pneumonia vaccine in 2023, and recent labs show normal thyroid function, cholesterol levels, blood sugar, electrolytes, kidney tests, liver enzymes, and blood counts.    She is on thyroid medication, which she obtains at a reduced cost, and is not currently taking biotin or folic acid. She has a colonoscopy scheduled for July 22 and had a mammogram in June 2024 at [facility]. She has not seen dermatology in a couple of years but has no concerns about moles or freckles.    Review of Systems   All other systems reviewed and are negative.    Results  Procedure: Wound care  Description: Small abrasion on the left forearm covered with  "antibiotic ointment and then covered with a nonstick Telfa and nonstick gauze.    LABS  Vitamin D: 90  TSH: Normal  Cholesterol: 218  LDL: 141  Blood glucose: Normal  Electrolytes: Normal  Renal function tests: Normal  Liver function tests: Normal  Complete blood count: Normal    RADIOLOGY  X-ray: Mild to moderate arthritis of the AC joint        Objective     /63 (BP Location: Right arm, Patient Position: Sitting, BP Cuff Size: Adult)   Pulse 63   Temp 36.1 °C (97 °F) (Temporal)   Ht (!) 1.543 m (5' 0.75\")   Wt 59.2 kg (130 lb 8 oz)   LMP  (LMP Unknown)   SpO2 97%   BMI 24.86 kg/m²      Physical Exam  GENERAL: Alert, cooperative, well developed, no acute distress.  HEENT: Normocephalic, normal oropharynx, moist mucous membranes.  CHEST: Clear to auscultation bilaterally, no wheezes, rhonchi, or crackles.  CARDIOVASCULAR: Normal heart rate and rhythm, S1 and S2 normal without murmurs.  ABDOMEN: Soft, non-tender, non-distended, without organomegaly, normal bowel sounds.  EXTREMITIES: No cyanosis or edema, good pulses.  MUSCULOSKELETAL: Tenderness over right AC joint with pain on internal and external rotation, consistent with rotator cuff tendonitis and AC joint arthritis.  NEUROLOGICAL: Cranial nerves grossly intact, moves all extremities without gross motor or sensory deficit.  SKIN: Small abrasion on left forearm, covered with antibiotic ointment and nonstick gauze.    Assessment & Plan  Right Shoulder Pain  Right shoulder pain limits activities such as reaching behind and certain exercises. Mild to moderate arthritis in the AC joint and pain consistent with rotator cuff tendonitis and arthritis, with tenderness on palpation and pain during internal and external rotation. Previous physical therapy completed. Discussed potential for cortisone injection as a temporary relief measure, which may last for months, to be administered to either the AC joint or the rotator cuff, depending on further " evaluation.  - Resume physical therapy exercises for the shoulder.  - Consider referral to an orthopedic specialist for further evaluation and possible cortisone injection.  - Advise keeping weights tight to the body during exercises to avoid aggravating the shoulder.      - Ensure spouse follows up with cardiology for anticoagulation management due to Atrial Fib and potential cardioversion.      General Health Maintenance  Scheduled colonoscopy and up to date with mammogram and bone density tests. Received both shingles vaccines and the pneumonia vaccine. Labs indicate normal thyroid function, cholesterol levels, and blood sugar. Discussed potential RSV vaccine, leaving decision to her as she is a non-smoker.  - Administer tetanus vaccine due to abrasion on the left forearm.  - Provide an order for a mammogram.  - Encourage continued healthy eating and exercise.    Param Estes MD     This medical note was created with the assistance of artificial intelligence (AI) for documentation purposes. The content has been reviewed and confirmed by the healthcare provider for accuracy and completeness. Patient consented to the use of audio recording and use of AI during their visit.

## 2025-07-21 PROBLEM — Z86.010 SURVEILLANCE DUE TO PRIOR COLONIC NEOPLASIA: Status: ACTIVE | Noted: 2025-07-22

## 2025-07-22 ENCOUNTER — AMBULATORY SURGICAL CENTER (OUTPATIENT)
Dept: URBAN - METROPOLITAN AREA SURGERY 12 | Facility: SURGERY | Age: 70
End: 2025-07-22
Payer: MEDICARE

## 2025-07-22 ENCOUNTER — OFFICE (OUTPATIENT)
Dept: URBAN - METROPOLITAN AREA PATHOLOGY 2 | Facility: PATHOLOGY | Age: 70
End: 2025-07-22
Payer: MEDICARE

## 2025-07-22 VITALS
HEART RATE: 66 BPM | SYSTOLIC BLOOD PRESSURE: 68 MMHG | DIASTOLIC BLOOD PRESSURE: 37 MMHG | RESPIRATION RATE: 12 BRPM | SYSTOLIC BLOOD PRESSURE: 84 MMHG | OXYGEN SATURATION: 99 % | SYSTOLIC BLOOD PRESSURE: 118 MMHG | DIASTOLIC BLOOD PRESSURE: 56 MMHG | HEART RATE: 56 BPM | SYSTOLIC BLOOD PRESSURE: 101 MMHG | DIASTOLIC BLOOD PRESSURE: 37 MMHG | HEART RATE: 55 BPM | SYSTOLIC BLOOD PRESSURE: 112 MMHG | RESPIRATION RATE: 13 BRPM | SYSTOLIC BLOOD PRESSURE: 83 MMHG | DIASTOLIC BLOOD PRESSURE: 51 MMHG | RESPIRATION RATE: 10 BRPM | HEART RATE: 55 BPM | HEART RATE: 68 BPM | HEIGHT: 61 IN | SYSTOLIC BLOOD PRESSURE: 95 MMHG | DIASTOLIC BLOOD PRESSURE: 66 MMHG | TEMPERATURE: 97.3 F | HEART RATE: 54 BPM | DIASTOLIC BLOOD PRESSURE: 51 MMHG | WEIGHT: 127 LBS | RESPIRATION RATE: 7 BRPM | RESPIRATION RATE: 12 BRPM | SYSTOLIC BLOOD PRESSURE: 81 MMHG | HEART RATE: 65 BPM | RESPIRATION RATE: 10 BRPM | SYSTOLIC BLOOD PRESSURE: 100 MMHG | SYSTOLIC BLOOD PRESSURE: 101 MMHG | HEART RATE: 59 BPM | RESPIRATION RATE: 7 BRPM | DIASTOLIC BLOOD PRESSURE: 52 MMHG | SYSTOLIC BLOOD PRESSURE: 112 MMHG | DIASTOLIC BLOOD PRESSURE: 73 MMHG | SYSTOLIC BLOOD PRESSURE: 77 MMHG | SYSTOLIC BLOOD PRESSURE: 95 MMHG | SYSTOLIC BLOOD PRESSURE: 81 MMHG | RESPIRATION RATE: 11 BRPM | DIASTOLIC BLOOD PRESSURE: 45 MMHG | HEART RATE: 65 BPM | RESPIRATION RATE: 13 BRPM | OXYGEN SATURATION: 100 % | DIASTOLIC BLOOD PRESSURE: 52 MMHG | RESPIRATION RATE: 12 BRPM | DIASTOLIC BLOOD PRESSURE: 73 MMHG | HEART RATE: 55 BPM | SYSTOLIC BLOOD PRESSURE: 77 MMHG | SYSTOLIC BLOOD PRESSURE: 80 MMHG | SYSTOLIC BLOOD PRESSURE: 87 MMHG | HEART RATE: 61 BPM | DIASTOLIC BLOOD PRESSURE: 73 MMHG | SYSTOLIC BLOOD PRESSURE: 87 MMHG | SYSTOLIC BLOOD PRESSURE: 95 MMHG | HEIGHT: 61 IN | WEIGHT: 127 LBS | DIASTOLIC BLOOD PRESSURE: 75 MMHG | OXYGEN SATURATION: 97 % | RESPIRATION RATE: 16 BRPM | SYSTOLIC BLOOD PRESSURE: 78 MMHG | RESPIRATION RATE: 16 BRPM | SYSTOLIC BLOOD PRESSURE: 87 MMHG | HEART RATE: 56 BPM | HEART RATE: 61 BPM | RESPIRATION RATE: 11 BRPM | SYSTOLIC BLOOD PRESSURE: 81 MMHG | DIASTOLIC BLOOD PRESSURE: 41 MMHG | RESPIRATION RATE: 8 BRPM | SYSTOLIC BLOOD PRESSURE: 80 MMHG | OXYGEN SATURATION: 100 % | DIASTOLIC BLOOD PRESSURE: 56 MMHG | SYSTOLIC BLOOD PRESSURE: 84 MMHG | DIASTOLIC BLOOD PRESSURE: 40 MMHG | DIASTOLIC BLOOD PRESSURE: 66 MMHG | DIASTOLIC BLOOD PRESSURE: 48 MMHG | OXYGEN SATURATION: 97 % | DIASTOLIC BLOOD PRESSURE: 40 MMHG | OXYGEN SATURATION: 99 % | DIASTOLIC BLOOD PRESSURE: 66 MMHG | SYSTOLIC BLOOD PRESSURE: 68 MMHG | SYSTOLIC BLOOD PRESSURE: 101 MMHG | OXYGEN SATURATION: 97 % | SYSTOLIC BLOOD PRESSURE: 83 MMHG | DIASTOLIC BLOOD PRESSURE: 56 MMHG | RESPIRATION RATE: 9 BRPM | HEART RATE: 54 BPM | DIASTOLIC BLOOD PRESSURE: 48 MMHG | TEMPERATURE: 97.3 F | DIASTOLIC BLOOD PRESSURE: 47 MMHG | HEART RATE: 56 BPM | HEART RATE: 57 BPM | DIASTOLIC BLOOD PRESSURE: 45 MMHG | RESPIRATION RATE: 9 BRPM | HEART RATE: 51 BPM | SYSTOLIC BLOOD PRESSURE: 100 MMHG | SYSTOLIC BLOOD PRESSURE: 118 MMHG | SYSTOLIC BLOOD PRESSURE: 80 MMHG | HEART RATE: 54 BPM | SYSTOLIC BLOOD PRESSURE: 82 MMHG | HEART RATE: 59 BPM | WEIGHT: 127 LBS | HEART RATE: 65 BPM | HEART RATE: 57 BPM | HEART RATE: 51 BPM | SYSTOLIC BLOOD PRESSURE: 78 MMHG | HEART RATE: 66 BPM | DIASTOLIC BLOOD PRESSURE: 45 MMHG | DIASTOLIC BLOOD PRESSURE: 48 MMHG | SYSTOLIC BLOOD PRESSURE: 82 MMHG | DIASTOLIC BLOOD PRESSURE: 37 MMHG | RESPIRATION RATE: 9 BRPM | SYSTOLIC BLOOD PRESSURE: 84 MMHG | DIASTOLIC BLOOD PRESSURE: 47 MMHG | DIASTOLIC BLOOD PRESSURE: 41 MMHG | DIASTOLIC BLOOD PRESSURE: 52 MMHG | RESPIRATION RATE: 8 BRPM | HEART RATE: 51 BPM | HEART RATE: 57 BPM | RESPIRATION RATE: 8 BRPM | HEART RATE: 68 BPM | DIASTOLIC BLOOD PRESSURE: 41 MMHG | RESPIRATION RATE: 13 BRPM | HEIGHT: 61 IN | DIASTOLIC BLOOD PRESSURE: 51 MMHG | OXYGEN SATURATION: 100 % | HEART RATE: 59 BPM | SYSTOLIC BLOOD PRESSURE: 68 MMHG | SYSTOLIC BLOOD PRESSURE: 83 MMHG | RESPIRATION RATE: 16 BRPM | HEART RATE: 66 BPM | SYSTOLIC BLOOD PRESSURE: 112 MMHG | DIASTOLIC BLOOD PRESSURE: 75 MMHG | SYSTOLIC BLOOD PRESSURE: 100 MMHG | HEART RATE: 68 BPM | DIASTOLIC BLOOD PRESSURE: 40 MMHG | SYSTOLIC BLOOD PRESSURE: 77 MMHG | HEART RATE: 61 BPM | OXYGEN SATURATION: 99 % | DIASTOLIC BLOOD PRESSURE: 75 MMHG | TEMPERATURE: 97.3 F | RESPIRATION RATE: 10 BRPM | SYSTOLIC BLOOD PRESSURE: 82 MMHG | RESPIRATION RATE: 11 BRPM | RESPIRATION RATE: 7 BRPM | SYSTOLIC BLOOD PRESSURE: 118 MMHG | SYSTOLIC BLOOD PRESSURE: 78 MMHG | DIASTOLIC BLOOD PRESSURE: 47 MMHG

## 2025-07-22 DIAGNOSIS — Z86.0101 PERSONAL HISTORY OF ADENOMATOUS AND SERRATED COLON POLYPS: ICD-10-CM

## 2025-07-22 DIAGNOSIS — K64.4 RESIDUAL HEMORRHOIDAL SKIN TAGS: ICD-10-CM

## 2025-07-22 DIAGNOSIS — K57.30 DIVERTICULOSIS OF LARGE INTESTINE WITHOUT PERFORATION OR ABS: ICD-10-CM

## 2025-07-22 DIAGNOSIS — D12.3 BENIGN NEOPLASM OF TRANSVERSE COLON: ICD-10-CM

## 2025-07-22 DIAGNOSIS — Z09 ENCOUNTER FOR FOLLOW-UP EXAMINATION AFTER COMPLETED TREATMEN: ICD-10-CM

## 2025-07-22 DIAGNOSIS — D12.2 BENIGN NEOPLASM OF ASCENDING COLON: ICD-10-CM

## 2025-07-22 DIAGNOSIS — K64.8 OTHER HEMORRHOIDS: ICD-10-CM

## 2025-07-22 DIAGNOSIS — K63.5 POLYP OF COLON: ICD-10-CM

## 2025-07-22 PROCEDURE — 88305 TISSUE EXAM BY PATHOLOGIST: CPT | Performed by: PATHOLOGY

## 2025-07-22 PROCEDURE — 45380 COLONOSCOPY AND BIOPSY: CPT | Mod: PT | Performed by: INTERNAL MEDICINE

## 2025-07-24 ENCOUNTER — PATIENT OUTREACH (OUTPATIENT)
Dept: PRIMARY CARE | Facility: CLINIC | Age: 70
End: 2025-07-24
Payer: MEDICARE

## 2025-07-24 DIAGNOSIS — K21.9 GASTROESOPHAGEAL REFLUX DISEASE WITHOUT ESOPHAGITIS: ICD-10-CM

## 2025-07-24 DIAGNOSIS — E78.2 MIXED HYPERLIPIDEMIA: ICD-10-CM

## 2025-07-24 DIAGNOSIS — Z12.11 COLON CANCER SCREENING: ICD-10-CM

## 2025-07-24 DIAGNOSIS — E55.9 VITAMIN D DEFICIENCY: ICD-10-CM

## 2025-07-24 NOTE — PROGRESS NOTES
Care Management Monthly Outreach  Chart review completed  Confirmation of at least 2 patient identifiers  Change in insurance? No    Has patient been to ER/Urgent Care since last outreach? No    Last Office Visit with PCP: 7/1/2025   Next Office Visit with PCP: Visit date not found   APC Collaboration: n/a    Chronic Conditions and Outreach Summary:   Colon cancer screening    Mixed hyperlipidemia    Vitamin D deficiency    Gastroesophageal reflux disease without esophagitis    Gertrudis just had her colonoscopy. She had some sinus issues but they have resolves. She remains active and eats healthy foods. Continues to take her thyroid medications. No issues presently.  No heart issues with exercising. Does cross fit.    Medications:   Are there medication changes since last visit? No  Refills needed? No    Social Drivers of Health: Deferred  Care Gaps Addressed? Deferred  Care Plan addressed: No    Upcoming Appointments:   Future Appointments       Date / Time Provider Department Dept Phone    7/7/2026 7:30 AM (Arrive by 7:15 AM) Param Estes MD [facility] [phone]          Blood Pressures Reviewed  BP Readings from Last 3 Encounters:   07/01/25 102/63   06/25/24 101/61   11/20/23 134/79     Labs Reviewed:  Lab Results   Component Value Date    CREATININE 0.83 06/25/2025    GLUCOSE 82 06/25/2025    ALKPHOS 81 06/25/2025    K 4.0 06/25/2025    PROT 6.0 (L) 06/25/2025     06/25/2025    CALCIUM 9.2 06/25/2025    AST 20 06/25/2025    ALT 16 06/25/2025    BUN 18 06/25/2025    GFRF >90 07/18/2023     Lab Results   Component Value Date    TRIG 79 06/25/2025    CHOL 218 (H) 06/25/2025    LDLCALC 141 (H) 06/25/2025    HDL 59 06/25/2025     Lab Results   Component Value Date    HGBA1C 5.2 06/20/2023    HGBA1C 5.2 06/14/2021    HGBA1C 5.4 06/10/2020     Lab Results   Component Value Date    WBC 6.3 06/25/2025    RBC 4.41 06/25/2025    HGB 13.7 06/25/2025     06/25/2025   No other concerns at this  time.  Agreeable to continue monthly outreaches.  Encouraged to call if questions or concerns arise.    MARIA FERNANDA CASILLAS RN BSN

## 2025-08-20 ENCOUNTER — HOSPITAL ENCOUNTER (OUTPATIENT)
Dept: RADIOLOGY | Facility: CLINIC | Age: 70
Discharge: HOME | End: 2025-08-20
Payer: MEDICARE

## 2025-08-20 DIAGNOSIS — M25.511 PAIN IN RIGHT SHOULDER: ICD-10-CM

## 2025-08-20 PROCEDURE — 73030 X-RAY EXAM OF SHOULDER: CPT | Mod: RIGHT SIDE | Performed by: RADIOLOGY

## 2025-08-20 PROCEDURE — 73030 X-RAY EXAM OF SHOULDER: CPT | Mod: RT

## 2025-08-27 ENCOUNTER — PATIENT OUTREACH (OUTPATIENT)
Dept: PRIMARY CARE | Facility: CLINIC | Age: 70
End: 2025-08-27
Payer: MEDICARE

## 2025-08-27 DIAGNOSIS — E78.2 MIXED HYPERLIPIDEMIA: ICD-10-CM

## 2025-08-27 DIAGNOSIS — E03.9 HYPOTHYROIDISM, UNSPECIFIED TYPE: ICD-10-CM

## 2025-08-27 DIAGNOSIS — K21.9 GASTROESOPHAGEAL REFLUX DISEASE WITHOUT ESOPHAGITIS: ICD-10-CM

## 2026-07-07 ENCOUNTER — APPOINTMENT (OUTPATIENT)
Dept: PRIMARY CARE | Facility: CLINIC | Age: 71
End: 2026-07-07
Payer: MEDICARE